# Patient Record
Sex: FEMALE
[De-identification: names, ages, dates, MRNs, and addresses within clinical notes are randomized per-mention and may not be internally consistent; named-entity substitution may affect disease eponyms.]

---

## 2017-09-22 ENCOUNTER — APPOINTMENT (OUTPATIENT)
Dept: UROGYNECOLOGY | Facility: CLINIC | Age: 82
End: 2017-09-22
Payer: MEDICARE

## 2017-09-22 DIAGNOSIS — R39.15 URGENCY OF URINATION: ICD-10-CM

## 2017-09-22 DIAGNOSIS — N99.3 PROLAPSE OF VAGINAL VAULT AFTER HYSTERECTOMY: ICD-10-CM

## 2017-09-22 DIAGNOSIS — Z78.9 OTHER SPECIFIED HEALTH STATUS: ICD-10-CM

## 2017-09-22 DIAGNOSIS — N81.11 CYSTOCELE, MIDLINE: ICD-10-CM

## 2017-09-22 DIAGNOSIS — Z87.828 PERSONAL HISTORY OF OTHER (HEALED) PHYSICAL INJURY AND TRAUMA: ICD-10-CM

## 2017-09-22 DIAGNOSIS — Z87.898 PERSONAL HISTORY OF OTHER SPECIFIED CONDITIONS: ICD-10-CM

## 2017-09-22 DIAGNOSIS — Z86.19 PERSONAL HISTORY OF OTHER INFECTIOUS AND PARASITIC DISEASES: ICD-10-CM

## 2017-09-22 DIAGNOSIS — R35.1 NOCTURIA: ICD-10-CM

## 2017-09-22 DIAGNOSIS — Z86.39 PERSONAL HISTORY OF OTHER ENDOCRINE, NUTRITIONAL AND METABOLIC DISEASE: ICD-10-CM

## 2017-09-22 DIAGNOSIS — R35.0 FREQUENCY OF MICTURITION: ICD-10-CM

## 2017-09-22 DIAGNOSIS — Z82.3 FAMILY HISTORY OF STROKE: ICD-10-CM

## 2017-09-22 DIAGNOSIS — M53.9 DORSOPATHY, UNSPECIFIED: ICD-10-CM

## 2017-09-22 DIAGNOSIS — E78.00 PURE HYPERCHOLESTEROLEMIA, UNSPECIFIED: ICD-10-CM

## 2017-09-22 DIAGNOSIS — N36.2 URETHRAL CARUNCLE: ICD-10-CM

## 2017-09-22 PROCEDURE — 99204 OFFICE O/P NEW MOD 45 MIN: CPT | Mod: 25

## 2017-09-22 PROCEDURE — 51701 INSERT BLADDER CATHETER: CPT

## 2017-09-22 RX ORDER — BENZOCAINE/BENZALKONIUM/ALOE/E 5 %-0.13 %
10 CREAM (GRAM) TOPICAL
Refills: 0 | Status: ACTIVE | COMMUNITY

## 2017-09-22 RX ORDER — ASCORBIC ACID 500 MG
TABLET ORAL
Refills: 0 | Status: ACTIVE | COMMUNITY

## 2017-09-22 RX ORDER — NITROFURANTOIN (MONOHYDRATE/MACROCRYSTALS) 25; 75 MG/1; MG/1
100 CAPSULE ORAL
Qty: 14 | Refills: 0 | Status: ACTIVE | COMMUNITY
Start: 2017-08-29

## 2017-09-22 RX ORDER — PROPRANOLOL HYDROCHLORIDE 60 MG/1
60 CAPSULE, EXTENDED RELEASE ORAL
Qty: 90 | Refills: 0 | Status: ACTIVE | COMMUNITY
Start: 2017-07-19

## 2017-09-22 RX ORDER — FLUCONAZOLE 150 MG/1
150 TABLET ORAL
Qty: 1 | Refills: 0 | Status: ACTIVE | COMMUNITY
Start: 2017-08-29

## 2017-09-22 RX ORDER — VITAMIN E ACID SUCCINATE 268 MG
TABLET ORAL
Refills: 0 | Status: ACTIVE | COMMUNITY

## 2017-09-22 RX ORDER — PROPRANOLOL HCL 60 MG
60 TABLET ORAL
Refills: 0 | Status: ACTIVE | COMMUNITY

## 2017-09-22 RX ORDER — MELATONIN 3 MG
TABLET ORAL
Refills: 0 | Status: ACTIVE | COMMUNITY

## 2017-09-22 RX ORDER — CALCIUM 250 MG
TABLET ORAL
Refills: 0 | Status: ACTIVE | COMMUNITY

## 2017-09-22 RX ORDER — MULTIVITAMIN
TABLET ORAL
Refills: 0 | Status: ACTIVE | COMMUNITY

## 2019-03-13 ENCOUNTER — EMERGENCY (EMERGENCY)
Facility: HOSPITAL | Age: 84
LOS: 1 days | Discharge: HOME | End: 2019-03-13
Attending: EMERGENCY MEDICINE
Payer: MEDICARE

## 2019-03-13 VITALS
HEART RATE: 82 BPM | OXYGEN SATURATION: 97 % | DIASTOLIC BLOOD PRESSURE: 89 MMHG | WEIGHT: 134.92 LBS | SYSTOLIC BLOOD PRESSURE: 170 MMHG | RESPIRATION RATE: 18 BRPM | TEMPERATURE: 98 F | HEIGHT: 64 IN

## 2019-03-13 LAB
ALBUMIN SERPL ELPH-MCNC: 4.4 G/DL — SIGNIFICANT CHANGE UP (ref 3.3–5.2)
ALP SERPL-CCNC: 60 U/L — SIGNIFICANT CHANGE UP (ref 40–120)
ALT FLD-CCNC: 7 U/L — SIGNIFICANT CHANGE UP
ANION GAP SERPL CALC-SCNC: 12 MMOL/L — SIGNIFICANT CHANGE UP (ref 5–17)
AST SERPL-CCNC: 22 U/L — SIGNIFICANT CHANGE UP
BILIRUB SERPL-MCNC: 0.3 MG/DL — LOW (ref 0.4–2)
BUN SERPL-MCNC: 10 MG/DL — SIGNIFICANT CHANGE UP (ref 8–20)
CALCIUM SERPL-MCNC: 9.8 MG/DL — SIGNIFICANT CHANGE UP (ref 8.6–10.2)
CHLORIDE SERPL-SCNC: 95 MMOL/L — LOW (ref 98–107)
CO2 SERPL-SCNC: 27 MMOL/L — SIGNIFICANT CHANGE UP (ref 22–29)
COHGB MFR BLDV: 2.7 % — HIGH (ref 0–2)
CREAT SERPL-MCNC: 0.36 MG/DL — LOW (ref 0.5–1.3)
GLUCOSE SERPL-MCNC: 101 MG/DL — SIGNIFICANT CHANGE UP (ref 70–115)
HCT VFR BLD CALC: 34.9 % — LOW (ref 37–47)
HGB BLD CALC-MCNC: 11.3 G/DL — LOW (ref 11.5–15.5)
HGB BLD-MCNC: 11.2 G/DL — LOW (ref 12–16)
MCHC RBC-ENTMCNC: 21.7 PG — LOW (ref 27–31)
MCHC RBC-ENTMCNC: 32.1 G/DL — SIGNIFICANT CHANGE UP (ref 32–36)
MCV RBC AUTO: 67.8 FL — LOW (ref 81–99)
PLATELET # BLD AUTO: 250 K/UL — SIGNIFICANT CHANGE UP (ref 150–400)
POTASSIUM SERPL-MCNC: 4.2 MMOL/L — SIGNIFICANT CHANGE UP (ref 3.5–5.3)
POTASSIUM SERPL-SCNC: 4.2 MMOL/L — SIGNIFICANT CHANGE UP (ref 3.5–5.3)
PROT SERPL-MCNC: 7.7 G/DL — SIGNIFICANT CHANGE UP (ref 6.6–8.7)
RBC # BLD: 5.15 M/UL — SIGNIFICANT CHANGE UP (ref 4.4–5.2)
RBC # FLD: 14.4 % — SIGNIFICANT CHANGE UP (ref 11–15.6)
SODIUM SERPL-SCNC: 134 MMOL/L — LOW (ref 135–145)
WBC # BLD: 5.7 K/UL — SIGNIFICANT CHANGE UP (ref 4.8–10.8)
WBC # FLD AUTO: 5.7 K/UL — SIGNIFICANT CHANGE UP (ref 4.8–10.8)

## 2019-03-13 PROCEDURE — 36415 COLL VENOUS BLD VENIPUNCTURE: CPT

## 2019-03-13 PROCEDURE — 80053 COMPREHEN METABOLIC PANEL: CPT

## 2019-03-13 PROCEDURE — 71046 X-RAY EXAM CHEST 2 VIEWS: CPT

## 2019-03-13 PROCEDURE — 99283 EMERGENCY DEPT VISIT LOW MDM: CPT | Mod: 25

## 2019-03-13 PROCEDURE — 93010 ELECTROCARDIOGRAM REPORT: CPT

## 2019-03-13 PROCEDURE — 84484 ASSAY OF TROPONIN QUANT: CPT

## 2019-03-13 PROCEDURE — 99284 EMERGENCY DEPT VISIT MOD MDM: CPT

## 2019-03-13 PROCEDURE — 85027 COMPLETE CBC AUTOMATED: CPT

## 2019-03-13 PROCEDURE — 71046 X-RAY EXAM CHEST 2 VIEWS: CPT | Mod: 26

## 2019-03-13 PROCEDURE — 93005 ELECTROCARDIOGRAM TRACING: CPT

## 2019-03-13 PROCEDURE — 82375 ASSAY CARBOXYHB QUANT: CPT
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
## 2019-03-13 NOTE — ED PROVIDER NOTE - PHYSICAL EXAMINATION
Gen: NAD, AOx3  Head: NCAT  HEENT: PERRL, EOMI, oral mucosa moist, normal conjunctiva, neck supple  Lung: CTAB, no respiratory distress  CV: rrr, no murmur, Normal perfusion  Abd: soft, NTND, no CVA tenderness  MSK: No edema, no visible deformities  Neuro: No focal neurologic deficits  Skin: No rash   Psych: normal affect Gen: NAD, AOx3  Head: NCAT  HEENT: PERRL, EOMI, oral mucosa moist, normal conjunctiva, neck supple  Lung: CTAB, no respiratory distress  CV: rrr, no murmur, Normal perfusion  Abd: soft, NTND, no CVA tenderness  MSK: No edema, no visible deformities  Neuro: CN II-XII intact, 5/5 global strength, sensation intact  Skin: No rash   Psych: normal affect Gen: NAD, AOx3  Head: NCAT  HEENT: PERRL, EOMI, oral mucosa moist, normal conjunctiva, neck supple  Lung: CTAB, no respiratory distress  CV: rrr, no murmur, Normal perfusion  Abd: soft, NTND  MSK: No edema, no visible deformities  Neuro: CN II-XII intact, 5/5 global strength, sensation intact  Skin: No rash   Psych: normal affect

## 2019-03-13 NOTE — ED PROVIDER NOTE - CARE PLAN
Principal Discharge DX:	Chest discomfort  Secondary Diagnosis:	Anxiety  Secondary Diagnosis:	Migraines Principal Discharge DX:	Chest discomfort  Assessment and plan of treatment:	1. Return to ED for worsening, progressive or any other concerning symptoms   2. Follow up with your primary care doctor in 2-3days  Secondary Diagnosis:	Anxiety  Secondary Diagnosis:	Migraines

## 2019-03-13 NOTE — ED PROVIDER NOTE - NS_ ATTENDINGSCRIBEDETAILS _ED_A_ED_FT
I, Buffy Guo, performed the initial face to face bedside interview with this patient regarding history of present illness, review of symptoms and relevant past medical, social and family history.  I completed an independent physical examination.  The history, relevant review of systems, past medical and surgical history, medical decision making, and physical examination was documented by the scribe in my presence and I attest to the accuracy of the documentation.
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
## 2019-03-13 NOTE — ED ADULT TRIAGE NOTE - CHIEF COMPLAINT QUOTE
pt BIBA from home for headache, as per EMS they were initially called for a possible CO exposure but there was no reading in the home. pt is awake and alert, states she has had a headache all day and is due for her anxiety meds tonight.

## 2019-03-14 VITALS
HEART RATE: 76 BPM | OXYGEN SATURATION: 96 % | RESPIRATION RATE: 18 BRPM | SYSTOLIC BLOOD PRESSURE: 138 MMHG | DIASTOLIC BLOOD PRESSURE: 71 MMHG

## 2019-03-14 NOTE — ED ADULT NURSE NOTE - NSIMPLEMENTINTERV_GEN_ALL_ED
Implemented All Fall with Harm Risk Interventions:  [place] to call system. Call bell, personal items and telephone within reach. Instruct patient to call for assistance. Room bathroom lighting operational. Non-slip footwear when patient is off stretcher. Physically safe environment: no spills, clutter or unnecessary equipment. Stretcher in lowest position, wheels locked, appropriate side rails in place. Provide visual cue, wrist band, yellow gown, etc. Monitor gait and stability. Monitor for mental status changes and reorient to person, place, and time. Review medications for side effects contributing to fall risk. Reinforce activity limits and safety measures with patient and family. Provide visual clues: red socks.
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
## 2019-05-31 ENCOUNTER — INPATIENT (INPATIENT)
Facility: HOSPITAL | Age: 84
LOS: 4 days | Discharge: NURSING, REHAB OR LONG-TERM CARE | DRG: 552 | End: 2019-06-05
Attending: SURGERY | Admitting: SURGERY
Payer: MEDICARE

## 2019-05-31 VITALS
WEIGHT: 104.06 LBS | TEMPERATURE: 99 F | RESPIRATION RATE: 18 BRPM | HEIGHT: 55 IN | SYSTOLIC BLOOD PRESSURE: 146 MMHG | OXYGEN SATURATION: 95 % | DIASTOLIC BLOOD PRESSURE: 70 MMHG | HEART RATE: 87 BPM

## 2019-05-31 DIAGNOSIS — Z90.710 ACQUIRED ABSENCE OF BOTH CERVIX AND UTERUS: Chronic | ICD-10-CM

## 2019-05-31 DIAGNOSIS — H26.9 UNSPECIFIED CATARACT: Chronic | ICD-10-CM

## 2019-05-31 DIAGNOSIS — S72.001S FRACTURE OF UNSPECIFIED PART OF NECK OF RIGHT FEMUR, SEQUELA: Chronic | ICD-10-CM

## 2019-05-31 DIAGNOSIS — Z90.49 ACQUIRED ABSENCE OF OTHER SPECIFIED PARTS OF DIGESTIVE TRACT: Chronic | ICD-10-CM

## 2019-05-31 PROCEDURE — 70450 CT HEAD/BRAIN W/O DYE: CPT | Mod: 26

## 2019-05-31 PROCEDURE — 72128 CT CHEST SPINE W/O DYE: CPT | Mod: 26

## 2019-05-31 PROCEDURE — 99218: CPT

## 2019-05-31 PROCEDURE — 72125 CT NECK SPINE W/O DYE: CPT | Mod: 26

## 2019-05-31 PROCEDURE — 72170 X-RAY EXAM OF PELVIS: CPT | Mod: 26

## 2019-05-31 PROCEDURE — 71046 X-RAY EXAM CHEST 2 VIEWS: CPT | Mod: 26

## 2019-05-31 RX ORDER — LIDOCAINE 4 G/100G
2 CREAM TOPICAL EVERY 24 HOURS
Refills: 0 | Status: DISCONTINUED | OUTPATIENT
Start: 2019-05-31 | End: 2019-06-05

## 2019-05-31 RX ORDER — ACETAMINOPHEN 500 MG
975 TABLET ORAL ONCE
Refills: 0 | Status: COMPLETED | OUTPATIENT
Start: 2019-05-31 | End: 2019-05-31

## 2019-05-31 NOTE — ED PROVIDER NOTE - PHYSICAL EXAMINATION
pt A&OX4, PERRL, follows commands, speech clear, moves all extremities; head atraumatic, tenderness noted to posterior scalp on palpation, skin intact; cervical spine tenderness noted, no step-offs, left thoracic paraspinal tenderness noted, no step-offs, left rib cage tenderness under left breast, lungs CTA, pt A&OX4, PERRL, follows commands, speech clear, moves all extremities; head atraumatic, tenderness noted to posterior scalp on palpation, skin intact; cervical spine tenderness noted, no step-offs, left thoracic paraspinal tenderness noted, no step-offs, left rib cage tenderness under left breast, lungs CTA, no focal neurological deficits, sensation intact

## 2019-05-31 NOTE — ED CDU PROVIDER INITIAL DAY NOTE - MEDICAL DECISION MAKING DETAILS
hx spinal stenosis s/p fall with acute t7 fx, evaluated by neurosurgery, pt for neuro checks q4, MRI and brace

## 2019-05-31 NOTE — ED PROVIDER NOTE - ATTENDING CONTRIBUTION TO CARE
s/p fall from wheelchair with head and neck pain;   denies loc;   pe awake in nad; heent ncat neck mild tenderness  midline cervical ; chest nontender ; abd soft ;  stable pelvis   dx neck pain and head injury ; ct of head and neck ;  ct neck positive for fx c7   neuro surgery and trauma evaluation s/p fall from rolling walker with head and neck pain;   denies loc;   pe awake in nad; heent ncat neck mild tenderness  midline cervical ; chest nontender ; abd soft ;  stable pelvis   dx neck pain and head injury ; ct of head and neck ;  ct neck positive for fx c7   neuro surgery and trauma evaluation
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
## 2019-05-31 NOTE — ED PROVIDER NOTE - CARE PLAN
Principal Discharge DX:	Closed fracture of seventh thoracic vertebra, unspecified fracture morphology, initial encounter
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
## 2019-05-31 NOTE — ED ADULT NURSE NOTE - NEURO WDL
Alert and oriented to person, place and time, memory intact, behavior appropriate to situation. LALIT

## 2019-05-31 NOTE — ED ADULT NURSE NOTE - NSIMPLEMENTINTERV_GEN_ALL_ED
Implemented All Fall with Harm Risk Interventions:  [place] to call system. Call bell, personal items and telephone within reach. Instruct patient to call for assistance. Room bathroom lighting operational. Non-slip footwear when patient is off stretcher. Physically safe environment: no spills, clutter or unnecessary equipment. Stretcher in lowest position, wheels locked, appropriate side rails in place. Provide visual cue, wrist band, yellow gown, etc. Monitor gait and stability. Monitor for mental status changes and reorient to person, place, and time. Review medications for side effects contributing to fall risk. Reinforce activity limits and safety measures with patient and family. Provide visual clues: red socks.

## 2019-05-31 NOTE — ED CDU PROVIDER INITIAL DAY NOTE - ATTENDING CONTRIBUTION TO CARE
I, William Brian, performed a face to face bedside interview with this patient regarding history of present illness, review of symptoms and relevant past medical, social and family history.  I completed an independent physical examination. I have communicated the patient’s plan of care and disposition with the ACP.       s/p[ fall with c7 fx; pt evaluated by neurosurgery and recommend neuro checks q4 ; pt to be evaluated by trauma I, William Brian, performed a face to face bedside interview with this patient regarding history of present illness, review of symptoms and relevant past medical, social and family history.  I completed an independent physical examination. I have communicated the patient’s plan of care and disposition with the ACP.       s/p[ fall with T7 fx; pt evaluated by neurosurgery and recommend neuro checks q4 ; pt to be evaluated by trauma
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
## 2019-05-31 NOTE — ED PROVIDER NOTE - CLINICAL SUMMARY MEDICAL DECISION MAKING FREE TEXT BOX
87 y/o F with head injury, back and neck pain, and left rib pain s/p fall, no LOC, not on blood thinners, Pain meds, CT head, cervical and thoracic spine, admit vs d/c home 89 y/o F with head injury, back and neck pain, and left rib pain s/p fall, no LOC, not on blood thinners, Pain meds, CT head, cervical and thoracic spine, admit vs d/c home    t7 fracture, seen by neurosurgery, transfer to observation unit for MRI and measurement for brace
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
## 2019-05-31 NOTE — ED PROVIDER NOTE - PSH
H/O: hysterectomy  May 1970 Cataract, right eye  surgery 8/15/18  H/O: hysterectomy  May 1970  Hip fracture requiring operative repair, right, sequela    History of cholecystectomy

## 2019-05-31 NOTE — CONSULT NOTE ADULT - ASSESSMENT
88F s/p fall with T7 fracture     Plan  1. STAT MRI T/S without contrast to r/o ligamentous injury and/or posterior element disruption/ rule out epidural hematoma  2. TLSO brace  3. Bedrest until MRI completed and reviewed by Neurosurgery  4. No antiplatelet/anticoagulation therapy at this time  5. Neuro checks q4  6. Case/films/plan discussed with Dr. Akins

## 2019-05-31 NOTE — ED ADULT NURSE REASSESSMENT NOTE - NS ED NURSE REASSESS COMMENT FT1
Pt to MRI, Pt admitted to OBS. MD Brian aware patient does not have an IV, MD aware As per MD patient does not need IV. Report given to SUZNA Goss in Obs. Safety maintained.

## 2019-05-31 NOTE — ED CDU PROVIDER INITIAL DAY NOTE - NS ED ROS FT
Pt arrives to the ED with c/o head injury without LOC. Pt sustains a laceration to right side of head rated 5/10, bleeding controlled. Pt reports dizziness post injury but states dizziness is improving. Pt denies nausea, vomiting, blurred vision, headache, and neck tenderness. A&Ox4. GCS 15. Pt appears in NAD.    + neck pain, +thoracic spine pain, +fell hit head no LOC, +left rib cage pain an d[pain on deep inspiration

## 2019-05-31 NOTE — ED CDU PROVIDER INITIAL DAY NOTE - PHYSICAL EXAMINATION
pt A&OX4, PERRL, follows commands, speech clear, moves all extremities; head atraumatic, tenderness noted to posterior scalp on palpation, skin intact; cervical spine tenderness noted, no step-offs, left thoracic paraspinal tenderness noted, no step-offs, left rib cage tenderness under left breast, lungs CTA,

## 2019-05-31 NOTE — ED ADULT NURSE REASSESSMENT NOTE - NS ED NURSE REASSESS COMMENT FT1
Assumed care of patient at 1930 from ongoing RN, alert and oriented x4, c/o left sided back pain. Pt took own Tylenol, refused any other pain medications at this time. Family at bedsides, no s/s of distress. PT taken to CT will reassess when patient returns.
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
## 2019-05-31 NOTE — ED PROVIDER NOTE - NS ED ROS FT
+ neck pain, +thoracic spine pain, +fell hit head no LOC, +left rib cage pain an d[pain on deep inspiration

## 2019-05-31 NOTE — ED CDU PROVIDER INITIAL DAY NOTE - PSH
Cataract, right eye  surgery 8/15/18  H/O: hysterectomy  May 1970  Hip fracture requiring operative repair, right, sequela    History of cholecystectomy

## 2019-06-01 DIAGNOSIS — S22.069A UNSPECIFIED FRACTURE OF T7-T8 VERTEBRA, INITIAL ENCOUNTER FOR CLOSED FRACTURE: ICD-10-CM
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
## 2019-06-01 LAB
ANION GAP SERPL CALC-SCNC: 12 MMOL/L — SIGNIFICANT CHANGE UP (ref 5–17)
BASOPHILS # BLD AUTO: 0 K/UL — SIGNIFICANT CHANGE UP (ref 0–0.2)
BASOPHILS NFR BLD AUTO: 0.4 % — SIGNIFICANT CHANGE UP (ref 0–2)
BUN SERPL-MCNC: 14 MG/DL — SIGNIFICANT CHANGE UP (ref 8–20)
CALCIUM SERPL-MCNC: 9.6 MG/DL — SIGNIFICANT CHANGE UP (ref 8.6–10.2)
CHLORIDE SERPL-SCNC: 99 MMOL/L — SIGNIFICANT CHANGE UP (ref 98–107)
CO2 SERPL-SCNC: 28 MMOL/L — SIGNIFICANT CHANGE UP (ref 22–29)
CREAT SERPL-MCNC: 0.57 MG/DL — SIGNIFICANT CHANGE UP (ref 0.5–1.3)
EOSINOPHIL # BLD AUTO: 0.1 K/UL — SIGNIFICANT CHANGE UP (ref 0–0.5)
EOSINOPHIL NFR BLD AUTO: 1.2 % — SIGNIFICANT CHANGE UP (ref 0–6)
GLUCOSE SERPL-MCNC: 90 MG/DL — SIGNIFICANT CHANGE UP (ref 70–115)
HCT VFR BLD CALC: 33.8 % — LOW (ref 37–47)
HGB BLD-MCNC: 10.9 G/DL — LOW (ref 12–16)
LYMPHOCYTES # BLD AUTO: 1.8 K/UL — SIGNIFICANT CHANGE UP (ref 1–4.8)
LYMPHOCYTES # BLD AUTO: 24.8 % — SIGNIFICANT CHANGE UP (ref 20–55)
MAGNESIUM SERPL-MCNC: 2 MG/DL — SIGNIFICANT CHANGE UP (ref 1.6–2.6)
MCHC RBC-ENTMCNC: 21.4 PG — LOW (ref 27–31)
MCHC RBC-ENTMCNC: 32.2 G/DL — SIGNIFICANT CHANGE UP (ref 32–36)
MCV RBC AUTO: 66.3 FL — LOW (ref 81–99)
MONOCYTES # BLD AUTO: 1 K/UL — HIGH (ref 0–0.8)
MONOCYTES NFR BLD AUTO: 13.9 % — HIGH (ref 3–10)
NEUTROPHILS # BLD AUTO: 4.3 K/UL — SIGNIFICANT CHANGE UP (ref 1.8–8)
NEUTROPHILS NFR BLD AUTO: 59.4 % — SIGNIFICANT CHANGE UP (ref 37–73)
PHOSPHATE SERPL-MCNC: 3.6 MG/DL — SIGNIFICANT CHANGE UP (ref 2.4–4.7)
PLATELET # BLD AUTO: 252 K/UL — SIGNIFICANT CHANGE UP (ref 150–400)
POTASSIUM SERPL-MCNC: 4.1 MMOL/L — SIGNIFICANT CHANGE UP (ref 3.5–5.3)
POTASSIUM SERPL-SCNC: 4.1 MMOL/L — SIGNIFICANT CHANGE UP (ref 3.5–5.3)
RBC # BLD: 5.1 M/UL — SIGNIFICANT CHANGE UP (ref 4.4–5.2)
RBC # FLD: 15 % — SIGNIFICANT CHANGE UP (ref 11–15.6)
SODIUM SERPL-SCNC: 139 MMOL/L — SIGNIFICANT CHANGE UP (ref 135–145)
WBC # BLD: 7.3 K/UL — SIGNIFICANT CHANGE UP (ref 4.8–10.8)
WBC # FLD AUTO: 7.3 K/UL — SIGNIFICANT CHANGE UP (ref 4.8–10.8)

## 2019-06-01 PROCEDURE — 99232 SBSQ HOSP IP/OBS MODERATE 35: CPT

## 2019-06-01 PROCEDURE — 99221 1ST HOSP IP/OBS SF/LOW 40: CPT

## 2019-06-01 PROCEDURE — 99222 1ST HOSP IP/OBS MODERATE 55: CPT | Mod: AI

## 2019-06-01 PROCEDURE — 72146 MRI CHEST SPINE W/O DYE: CPT | Mod: 26

## 2019-06-01 PROCEDURE — 99217: CPT

## 2019-06-01 RX ORDER — SERTRALINE 25 MG/1
25 TABLET, FILM COATED ORAL DAILY
Refills: 0 | Status: DISCONTINUED | OUTPATIENT
Start: 2019-06-01 | End: 2019-06-05

## 2019-06-01 RX ORDER — GABAPENTIN 400 MG/1
100 CAPSULE ORAL
Qty: 0 | Refills: 0 | DISCHARGE

## 2019-06-01 RX ORDER — GABAPENTIN 400 MG/1
200 CAPSULE ORAL AT BEDTIME
Refills: 0 | Status: DISCONTINUED | OUTPATIENT
Start: 2019-06-01 | End: 2019-06-02

## 2019-06-01 RX ORDER — PROPRANOLOL HCL 160 MG
60 CAPSULE, EXTENDED RELEASE 24HR ORAL AT BEDTIME
Refills: 0 | Status: DISCONTINUED | OUTPATIENT
Start: 2019-06-01 | End: 2019-06-05

## 2019-06-01 RX ORDER — GABAPENTIN 400 MG/1
200 CAPSULE ORAL
Qty: 0 | Refills: 0 | DISCHARGE

## 2019-06-01 RX ORDER — SERTRALINE 25 MG/1
1 TABLET, FILM COATED ORAL
Qty: 0 | Refills: 0 | DISCHARGE

## 2019-06-01 RX ORDER — ACETAMINOPHEN 500 MG
650 TABLET ORAL EVERY 6 HOURS
Refills: 0 | Status: DISCONTINUED | OUTPATIENT
Start: 2019-06-01 | End: 2019-06-05

## 2019-06-01 RX ORDER — PROPRANOLOL HCL 160 MG
1 CAPSULE, EXTENDED RELEASE 24HR ORAL
Qty: 0 | Refills: 0 | DISCHARGE

## 2019-06-01 RX ORDER — TRAMADOL HYDROCHLORIDE 50 MG/1
25 TABLET ORAL EVERY 4 HOURS
Refills: 0 | Status: DISCONTINUED | OUTPATIENT
Start: 2019-06-01 | End: 2019-06-05

## 2019-06-01 RX ORDER — ACETAMINOPHEN 500 MG
650 TABLET ORAL EVERY 6 HOURS
Refills: 0 | Status: DISCONTINUED | OUTPATIENT
Start: 2019-06-01 | End: 2019-06-01

## 2019-06-01 RX ADMIN — Medication 650 MILLIGRAM(S): at 23:00

## 2019-06-01 RX ADMIN — Medication 650 MILLIGRAM(S): at 04:30

## 2019-06-01 RX ADMIN — Medication 650 MILLIGRAM(S): at 15:35

## 2019-06-01 RX ADMIN — Medication 650 MILLIGRAM(S): at 16:35

## 2019-06-01 RX ADMIN — Medication 650 MILLIGRAM(S): at 03:34

## 2019-06-01 RX ADMIN — Medication 650 MILLIGRAM(S): at 22:10

## 2019-06-01 RX ADMIN — GABAPENTIN 200 MILLIGRAM(S): 400 CAPSULE ORAL at 22:09

## 2019-06-01 RX ADMIN — Medication 60 MILLIGRAM(S): at 22:09

## 2019-06-01 NOTE — CONSULT NOTE ADULT - ASSESSMENT
88 year old female history of chronic back pain now s/p fall from seated walker found to have an acute T7 spinal fracture.  - admit to trauma surgery   - pain control  - restart home medications  - Neurosurgery on board: recommend MRI spine, TLSO brace, q4 neurochecks, no AC, bedrest until MRI completed/reviewed by NSx  - f/u MRI official read  - PT when pt has brace

## 2019-06-01 NOTE — ED CDU PROVIDER DISPOSITION NOTE - CLINICAL COURSE
female pmh spinal stenosis,  s/p fall with T7 spinal fracture;  pt to be admitted to service of Trauma;

## 2019-06-01 NOTE — PROGRESS NOTE ADULT - ASSESSMENT
88y Female PMH chronic back pain followed by neurologist s/p fall from seated walker found with acute T7 fracture, acute to subacute L1 inferior endplate fx.  - No focal neurologic deficit    PLAN:  - D/w Dr. Akins  - TLSO when OOB, orthotist to bring to bedside today  - Pain control PRN  - PT/OT/PM&R evals once brace at bedside  - Supportive care/further medical management per primary team  - Follow up with Dr. Akins or if preferred due to location of offices, Dr. Snider in 2 weeks  - Reconsult PRN 88y Female PMH chronic back pain followed by neurologist s/p fall from seated walker found with acute T7 fracture, acute to subacute L1 inferior endplate fx.  - No focal neurologic deficit  - MR T spine w/ acute T7 fx without ligamentous injury or posterior element involvement (d/w Dr. Alexandra)    PLAN:  - D/w Dr. Akins  - TLSO when OOB, orthotist to bring to bedside today  - Pain control PRN  - PT/OT/PM&R evals once brace at bedside  - Supportive care/further medical management per primary team  - Follow up with Dr. Akins or if preferred due to location of offices, Dr. Snider in 2 weeks  - Reconsult PRN

## 2019-06-01 NOTE — ED ADULT NURSE REASSESSMENT NOTE - NS ED NURSE REASSESS COMMENT FT1
assumed pt care from previous Rn.  pt transported to CDU-9 for observation. pt  A&Ox3; with slight discomfort to lower back. pt refusing pain medication at this time. pt reported, " I took Tylenol before at it helped." pt repositioned in bed for comfort.   resting in stretcher,  talking with daughter's at bedside. B/L lungs clear, normal s1&s2 heard on ausculation. (+) pedal pulses, skin warm/dry.  VSS and documented as per flow sheet. plan of care reviewed and pt/family verbalizes understanding. bed in lowest position, call bell within reach and safety maintained. monitoring ongoing for any changes.
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
## 2019-06-01 NOTE — PROGRESS NOTE ADULT - SUBJECTIVE AND OBJECTIVE BOX
patient evaluated measured and fitted for tlso spinal orthosis  ordered by neurosurgery delivered by [facility] [phone]

## 2019-06-01 NOTE — ED CDU PROVIDER DISPOSITION NOTE - ATTENDING CONTRIBUTION TO CARE
evaluated by trauma for fall with T7 stable fracture; evaluated by trauma for fall with T7 stable fracture; pt to be admitted to trauma service
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
## 2019-06-01 NOTE — CONSULT NOTE ADULT - ATTENDING COMMENTS
Agree with above assessment.  The patient was seen and examined by me.  The patient was reported to have fallen from seated walker.  The patient is without LOC.  She has complaints of back pain.  HEENT NC/AT PERRL EOMI no raccoon eyes, no ortega signs, trachea midline, no gross mass, chest bilateral air entry, abdomen is soft and nontender, no guarding, no rebound, no pelvic tenderness, extremities with no gross deformity.  The patient on CT scan has a T7 fracture.  MRI results to rule out ligamentous injury is pending.  Admit to trauma, neurosurgery consult, pain control.  Will need PT evaluation.  Awaiting Neurosurgery evaluation and MRI results.

## 2019-06-02 PROBLEM — G43.909 MIGRAINE, UNSPECIFIED, NOT INTRACTABLE, WITHOUT STATUS MIGRAINOSUS: Chronic | Status: ACTIVE | Noted: 2019-03-13

## 2019-06-02 PROBLEM — M48.00 SPINAL STENOSIS, SITE UNSPECIFIED: Chronic | Status: ACTIVE | Noted: 2019-03-13

## 2019-06-02 PROBLEM — M54.9 DORSALGIA, UNSPECIFIED: Chronic | Status: ACTIVE | Noted: 2019-03-13

## 2019-06-02 LAB
ANION GAP SERPL CALC-SCNC: 11 MMOL/L — SIGNIFICANT CHANGE UP (ref 5–17)
BASOPHILS # BLD AUTO: 0 K/UL — SIGNIFICANT CHANGE UP (ref 0–0.2)
BASOPHILS NFR BLD AUTO: 0.3 % — SIGNIFICANT CHANGE UP (ref 0–2)
BUN SERPL-MCNC: 13 MG/DL — SIGNIFICANT CHANGE UP (ref 8–20)
CALCIUM SERPL-MCNC: 9.4 MG/DL — SIGNIFICANT CHANGE UP (ref 8.6–10.2)
CHLORIDE SERPL-SCNC: 98 MMOL/L — SIGNIFICANT CHANGE UP (ref 98–107)
CO2 SERPL-SCNC: 29 MMOL/L — SIGNIFICANT CHANGE UP (ref 22–29)
CREAT SERPL-MCNC: 0.56 MG/DL — SIGNIFICANT CHANGE UP (ref 0.5–1.3)
EOSINOPHIL # BLD AUTO: 0.2 K/UL — SIGNIFICANT CHANGE UP (ref 0–0.5)
EOSINOPHIL NFR BLD AUTO: 3.1 % — SIGNIFICANT CHANGE UP (ref 0–6)
GLUCOSE SERPL-MCNC: 85 MG/DL — SIGNIFICANT CHANGE UP (ref 70–115)
HCT VFR BLD CALC: 34.2 % — LOW (ref 37–47)
HGB BLD-MCNC: 10.7 G/DL — LOW (ref 12–16)
LYMPHOCYTES # BLD AUTO: 2.2 K/UL — SIGNIFICANT CHANGE UP (ref 1–4.8)
LYMPHOCYTES # BLD AUTO: 29.5 % — SIGNIFICANT CHANGE UP (ref 20–55)
MAGNESIUM SERPL-MCNC: 2 MG/DL — SIGNIFICANT CHANGE UP (ref 1.6–2.6)
MCHC RBC-ENTMCNC: 21.5 PG — LOW (ref 27–31)
MCHC RBC-ENTMCNC: 31.3 G/DL — LOW (ref 32–36)
MCV RBC AUTO: 68.7 FL — LOW (ref 81–99)
MONOCYTES # BLD AUTO: 1 K/UL — HIGH (ref 0–0.8)
MONOCYTES NFR BLD AUTO: 13.7 % — HIGH (ref 3–10)
NEUTROPHILS # BLD AUTO: 3.9 K/UL — SIGNIFICANT CHANGE UP (ref 1.8–8)
NEUTROPHILS NFR BLD AUTO: 53.3 % — SIGNIFICANT CHANGE UP (ref 37–73)
PHOSPHATE SERPL-MCNC: 3.3 MG/DL — SIGNIFICANT CHANGE UP (ref 2.4–4.7)
PLATELET # BLD AUTO: 229 K/UL — SIGNIFICANT CHANGE UP (ref 150–400)
POTASSIUM SERPL-MCNC: 4.9 MMOL/L — SIGNIFICANT CHANGE UP (ref 3.5–5.3)
POTASSIUM SERPL-SCNC: 4.9 MMOL/L — SIGNIFICANT CHANGE UP (ref 3.5–5.3)
RBC # BLD: 4.98 M/UL — SIGNIFICANT CHANGE UP (ref 4.4–5.2)
RBC # FLD: 14.9 % — SIGNIFICANT CHANGE UP (ref 11–15.6)
SODIUM SERPL-SCNC: 138 MMOL/L — SIGNIFICANT CHANGE UP (ref 135–145)
WBC # BLD: 7.4 K/UL — SIGNIFICANT CHANGE UP (ref 4.8–10.8)
WBC # FLD AUTO: 7.4 K/UL — SIGNIFICANT CHANGE UP (ref 4.8–10.8)

## 2019-06-02 PROCEDURE — 99231 SBSQ HOSP IP/OBS SF/LOW 25: CPT

## 2019-06-02 RX ORDER — SENNA PLUS 8.6 MG/1
2 TABLET ORAL AT BEDTIME
Refills: 0 | Status: DISCONTINUED | OUTPATIENT
Start: 2019-06-02 | End: 2019-06-05

## 2019-06-02 RX ORDER — GABAPENTIN 400 MG/1
100 CAPSULE ORAL DAILY
Refills: 0 | Status: DISCONTINUED | OUTPATIENT
Start: 2019-06-02 | End: 2019-06-05

## 2019-06-02 RX ORDER — GABAPENTIN 400 MG/1
200 CAPSULE ORAL AT BEDTIME
Refills: 0 | Status: DISCONTINUED | OUTPATIENT
Start: 2019-06-02 | End: 2019-06-05

## 2019-06-02 RX ORDER — DOCUSATE SODIUM 100 MG
100 CAPSULE ORAL THREE TIMES A DAY
Refills: 0 | Status: DISCONTINUED | OUTPATIENT
Start: 2019-06-02 | End: 2019-06-05

## 2019-06-02 RX ORDER — POLYETHYLENE GLYCOL 3350 17 G/17G
17 POWDER, FOR SOLUTION ORAL DAILY
Refills: 0 | Status: DISCONTINUED | OUTPATIENT
Start: 2019-06-02 | End: 2019-06-05

## 2019-06-02 RX ADMIN — Medication 60 MILLIGRAM(S): at 21:52

## 2019-06-02 RX ADMIN — Medication 650 MILLIGRAM(S): at 21:52

## 2019-06-02 RX ADMIN — GABAPENTIN 200 MILLIGRAM(S): 400 CAPSULE ORAL at 21:53

## 2019-06-02 RX ADMIN — GABAPENTIN 100 MILLIGRAM(S): 400 CAPSULE ORAL at 12:24

## 2019-06-02 RX ADMIN — Medication 650 MILLIGRAM(S): at 22:40

## 2019-06-02 RX ADMIN — Medication 100 MILLIGRAM(S): at 21:53

## 2019-06-02 RX ADMIN — SERTRALINE 25 MILLIGRAM(S): 25 TABLET, FILM COATED ORAL at 12:27

## 2019-06-02 RX ADMIN — Medication 650 MILLIGRAM(S): at 12:14

## 2019-06-02 RX ADMIN — Medication 650 MILLIGRAM(S): at 13:15

## 2019-06-02 RX ADMIN — LIDOCAINE 2 PATCH: 4 CREAM TOPICAL at 12:15

## 2019-06-02 NOTE — PHYSICAL THERAPY INITIAL EVALUATION ADULT - PLANNED THERAPY INTERVENTIONS, PT EVAL
motor coordination training/transfer training/strengthening/gait training/ROM/balance training/bed mobility training/neuromuscular re-education

## 2019-06-02 NOTE — PROGRESS NOTE ADULT - ASSESSMENT
88 year old female history of chronic back pain now s/p fall from seated walker found to have an acute T7 spinal fracture.  - pain control  -TLSO brace  -Regular diet  -f/u PT/OT/PMR

## 2019-06-02 NOTE — PHYSICAL THERAPY INITIAL EVALUATION ADULT - ADDITIONAL COMMENTS
Pt reports ambulating with RW indoors, uses rollator for community. Pt does not drive, daughter assists with grocery shopping, cooking, doctor appts

## 2019-06-02 NOTE — PROGRESS NOTE ADULT - SUBJECTIVE AND OBJECTIVE BOX
HPI/OVERNIGHT EVENTS: Patient seen and examined at bedside this AM. No acute events overnight per nursing reports. Patient's pain well controlled, tolerating a regular diet, having bowel function. Denies fever, chills, nausea, vomitting, chest pain, SOB, dizziness, abd pain or any other concerning symptoms    Vital Signs Last 24 Hrs  T(C): 36.7 (02 Jun 2019 05:38), Max: 37.2 (01 Jun 2019 08:34)  T(F): 98 (02 Jun 2019 05:38), Max: 99 (01 Jun 2019 08:34)  HR: 67 (02 Jun 2019 05:38) (67 - 81)  BP: 126/85 (02 Jun 2019 05:38) (123/65 - 137/66)  BP(mean): --  RR: 18 (02 Jun 2019 05:38) (16 - 18)  SpO2: 98% (02 Jun 2019 05:38) (92% - 98%)    I&O's Detail    31 May 2019 07:01  -  01 Jun 2019 07:00  --------------------------------------------------------  IN:  Total IN: 0 mL    OUT:    Voided: 2 mL  Total OUT: 2 mL    Total NET: -2 mL      01 Jun 2019 07:01  -  02 Jun 2019 05:43  --------------------------------------------------------  IN:    Oral Fluid: 240 mL  Total IN: 240 mL    OUT:    Voided: 1 mL  Total OUT: 1 mL    Total NET: 239 mL        Constitutional: patient resting comfortably in bed, in no acute distress  HEENT: EOMI / PERRL b/l  Neck: No JVD, full ROM without pain  Respiratory: CTAB respirations are unlabored, no accessory muscle use, no conversational dyspnea  Cardiovascular: regular rate & rhythm  Gastrointestinal: Abdomen soft, non-tender, non-distended, no rebound tenderness / guarding  Musculoskeletal: No joint pain, swelling or deformity    LABS:                        10.9   7.3   )-----------( 252      ( 01 Jun 2019 08:07 )             33.8     06-01    139  |  99  |  14.0  ----------------------------<  90  4.1   |  28.0  |  0.57    Ca    9.6      01 Jun 2019 08:07  Phos  3.6     06-01  Mg     2.0     06-01            MEDICATIONS  (STANDING):  acetaminophen   Tablet .. 650 milliGRAM(s) Oral every 6 hours  gabapentin   Solution 200 milliGRAM(s) Oral at bedtime  lidocaine   Patch 2 Patch Transdermal every 24 hours  propranolol LA 60 milliGRAM(s) Oral at bedtime  sertraline 25 milliGRAM(s) Oral daily    MEDICATIONS  (PRN):  traMADol 25 milliGRAM(s) Oral every 4 hours PRN Severe Pain (7 - 10)      MICRO:   Cultures     STUDIES:   EKG, CXR, U/S, CT, MRI

## 2019-06-02 NOTE — PHYSICAL THERAPY INITIAL EVALUATION ADULT - IMPAIRMENTS CONTRIBUTING TO GAIT DEVIATIONS, PT EVAL
pain/impaired balance/impaired coordination/impaired motor control/narrow base of support/decreased ROM/decreased strength

## 2019-06-02 NOTE — PHYSICAL THERAPY INITIAL EVALUATION ADULT - PHYSICAL ASSIST/NONPHYSICAL ASSIST: STAND/SIT, REHAB EVAL
[facility]        NAME: Alejandro Lawler is a 22 y o  female  : 1993    MRN: [id number]  DATE: 2018  TIME: 8:34 AM    Assessment and Plan   39 weeks gestation of pregnancy [Z3A [id number]]  1  39 weeks gestation of pregnancy           Patient Instructions   Patient is currently 44 months pregnant and do the beginning of October  She presents with concerns that her blood pressure was higher than normal approximately 120/80  She denies vision changes, headache, dizziness, syncope, weakness,etc   She also reports that the baby is active  No vaginal bleeding  I advised her to call her Ob/gyn today to report her blood pressure recordings  She should continue to monitor  Follow up with PCP in 3-5 days  Proceed to  ER if symptoms worsen  Chief Complaint     Chief Complaint   Patient presents with    Hypertension     pt thought blood pressure was elevated this am , pt is pregnant due oct 6th  History of Present Illness   Patient is a 26-year-old female who presents with concerns of elevated blood pressure from a reading this morning  She is currently 39 weeks pregnant  She states that she has had a normal pregnancy without complication  Her blood pressure is typically low 100's  This morning she took her blood pressure at home and it was 120's/80  She currently denies blurred vision or vision changes  Negative ringing in her ears  Negative headache  Negative swelling of her face or extremities that has occurred suddenly  She denies vaginal bleeding  Negative chest pain or shortness of breath  Negative dizziness or syncope  She states that the baby is active  HPI    Review of Systems   Review of Systems   Constitutional: Negative for activity change, chills and fever  Respiratory: Negative for chest tightness and shortness of breath  Cardiovascular: Negative for chest pain, palpitations and leg swelling  Genitourinary: Negative for vaginal bleeding     Skin: Negative for pallor  Neurological: Negative for dizziness, tremors, seizures, syncope, facial asymmetry, speech difficulty, weakness, light-headedness, numbness and headaches  All other systems reviewed and are negative  Current Medications       Current Outpatient Prescriptions:     levothyroxine 75 mcg tablet, Take 75 mcg by mouth daily, Disp: , Rfl:     nystatin (MYCOSTATIN) powder, Apply topically 2 (two) times a day, Disp: 45 g, Rfl: 0    Prenatal Vit-Fe Fumarate-FA (PRENATAL VITAMIN) 27-0 8 MG TABS, Take by mouth, Disp: , Rfl:     Current Allergies     Allergies as of 09/25/2018 - Reviewed 09/25/2018   Allergen Reaction Noted    Penicillins Other (See Comments) 03/29/2018            The following portions of the patient's history were reviewed and updated as appropriate: allergies, current medications, past family history, past medical history, past social history, past surgical history and problem list      Past Medical History:   Diagnosis Date    Hashimoto's disease     Varicella        Past Surgical History:   Procedure Laterality Date    MYRINGOTOMY      NOSE SURGERY         Family History   Problem Relation Age of Onset    Hashimoto's thyroiditis Mother     Chronic bronchitis Father          Medications have been verified  Objective   /79   Pulse 86   Temp 98 1 °F (36 7 °C) (Temporal)   Resp 20   Ht 5' 2" (1 575 m)   Wt 78 1 kg (172 lb 2 oz)   LMP 12/30/2017 (Exact Date)   BMI 31 48 kg/m²        Physical Exam     Physical Exam   Constitutional: She is oriented to person, place, and time  She appears well-developed and well-nourished  No distress  HENT:   Head: Normocephalic and atraumatic  Eyes: Conjunctivae and EOM are normal  Pupils are equal, round, and reactive to light  Right eye exhibits no discharge  Left eye exhibits no discharge  No scleral icterus  Cardiovascular: Normal rate, regular rhythm, normal heart sounds and intact distal pulses    Exam reveals no gallop and no friction rub  No murmur heard  Pulmonary/Chest: Effort normal and breath sounds normal  No respiratory distress  She has no wheezes  She has no rales  She exhibits no tenderness  Abdominal: Soft  Bowel sounds are normal  There is no tenderness  Distended abdomen-pregnancy  Baby is moving  Musculoskeletal: Normal range of motion  She exhibits edema (Trace lower extremity bilaterally)  She exhibits no tenderness or deformity  Neurological: She is alert and oriented to person, place, and time  Skin: Skin is warm and dry  No rash noted  She is not diaphoretic  No erythema  No pallor  Psychiatric: She has a normal mood and affect  Her behavior is normal    Nursing note and vitals reviewed  1 person assist
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
## 2019-06-03 ENCOUNTER — TRANSCRIPTION ENCOUNTER (OUTPATIENT)
Age: 84
End: 2019-06-03

## 2019-06-03 PROCEDURE — 99231 SBSQ HOSP IP/OBS SF/LOW 25: CPT

## 2019-06-03 PROCEDURE — 99222 1ST HOSP IP/OBS MODERATE 55: CPT

## 2019-06-03 RX ORDER — ENOXAPARIN SODIUM 100 MG/ML
40 INJECTION SUBCUTANEOUS DAILY
Refills: 0 | Status: DISCONTINUED | OUTPATIENT
Start: 2019-06-03 | End: 2019-06-05

## 2019-06-03 RX ADMIN — Medication 650 MILLIGRAM(S): at 14:00

## 2019-06-03 RX ADMIN — Medication 650 MILLIGRAM(S): at 13:22

## 2019-06-03 RX ADMIN — Medication 650 MILLIGRAM(S): at 06:20

## 2019-06-03 RX ADMIN — Medication 60 MILLIGRAM(S): at 20:19

## 2019-06-03 RX ADMIN — Medication 100 MILLIGRAM(S): at 05:27

## 2019-06-03 RX ADMIN — Medication 650 MILLIGRAM(S): at 05:27

## 2019-06-03 RX ADMIN — GABAPENTIN 200 MILLIGRAM(S): 400 CAPSULE ORAL at 20:19

## 2019-06-03 RX ADMIN — GABAPENTIN 100 MILLIGRAM(S): 400 CAPSULE ORAL at 13:22

## 2019-06-03 RX ADMIN — SERTRALINE 25 MILLIGRAM(S): 25 TABLET, FILM COATED ORAL at 13:21

## 2019-06-03 RX ADMIN — LIDOCAINE 2 PATCH: 4 CREAM TOPICAL at 00:30

## 2019-06-03 RX ADMIN — Medication 650 MILLIGRAM(S): at 20:24

## 2019-06-03 RX ADMIN — Medication 650 MILLIGRAM(S): at 21:20

## 2019-06-03 NOTE — PROGRESS NOTE ADULT - PROBLEM SELECTOR PLAN 1
- Neurosx recs appreciated - TLSO when OOB  - Continue work with PT, OT, and PM&R  - Pain regimen as ordered - options limited due to multiple listed allergies  - Regular diet, as tolerated  - DVT ppx with lovenox & b/l SCDs  - Incentive spirometer for pulm toileting

## 2019-06-03 NOTE — OCCUPATIONAL THERAPY INITIAL EVALUATION ADULT - ADDITIONAL COMMENTS
Pt states lives alone since  passed away 3 weeks ago and pays for a 24 hour aid at home and family assist as well with IADLs. Pt has RW, rollator and shower chair but sponge baths at home and aid assists. Pt lives in a home and no stairs to enter.

## 2019-06-03 NOTE — DISCHARGE NOTE PROVIDER - CARE PROVIDERS DIRECT ADDRESSES
[email or web address]
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
## 2019-06-03 NOTE — OCCUPATIONAL THERAPY INITIAL EVALUATION ADULT - RANGE OF MOTION EXAMINATION
no Active ROM deficits were identified/pt has premorbid rotator cuff injuries and only can flex shoulders to 80 degrees

## 2019-06-03 NOTE — DISCHARGE NOTE PROVIDER - CARE PROVIDER_API CALL
Isaac Snider)  Neurosurgery  [facility] [address]  [place] [address]  Phone: [phone]  Fax: [phone]  Follow Up Time:     William Akins; PhD)  Neurosurgery  [address] [facility], 2nd [place] [address]  Phone: [phone]  Fax: [phone]  Follow Up Time:

## 2019-06-03 NOTE — CONSULT NOTE ADULT - SUBJECTIVE AND OBJECTIVE BOX
88y Female was admitted on 06-01  In ED, GCS= 15    Patient is a 88y old  Female who presents with a chief complaint of fall  HPI: 88 year old female with a history of chronic back pain and baseline urinary incontinence presents to ED on 5/21 s/p fall from seated walker, -LOC, +headstrike. Per patient, daughter was rolling pt in a seated rolling walker when they hit a curb causing the pt to fall backward onto the walker's back bar causing immediate pain. Pain radiated down entire spine, 10/10 pain which lasted several minutes. The pain has subsided slightly but pt reports both spinal and paraspinal pain at this time, now 6/10 in severity. She also reported head soreness and neck soreness. Denies headache, changes in vision, chest pain, shortness of breath, nausea, vomiting. CT spine demonstrated acute nondisplaced T7 fracture. MRI of thoracic spine redisplaying T7 vertebral body fx with acute/subacute L1 fracture as well without posterior element involvement or ligamentous injury. Patient was evaluated by neurosurgery and trauma surgery teams and recommended conservative management with TLSO brace and pain control.     Imaging showed:  HEAD CT - No acute findings  C SPINE CT - No acute findings, Mild/moderate central C3-4 disc extrusion.  CT spine demonstrates acute nondisplaced T7 fracture.  MRI thoracic Spine: Acute nondisplaced fracture through the T7 vertebral body. Acute to subacute fracture inferior endplate L1. Spinal cord negative      TODAY'S SUBJECTIVE HX:    REVIEW OF SYSTEMS  Constitutional - No fever, No weight loss, No fatigue  HEENT - No eye pain, No visual disturbances, No difficulty hearing, No tinnitus, No vertigo, No neck pain  Respiratory - No cough, No wheezing, No shortness of breath  Cardiovascular - No chest pain, No palpitations  Gastrointestinal - No abdominal pain, No nausea, No vomiting, No diarrhea, No constipation  Genitourinary - No dysuria, No frequency, No hematuria, No incontinence  Neurological - No headaches, No memory loss, No loss of strength, No numbness, No tremors  Skin - No itching, No rashes, No lesions   Endocrine - No temperature intolerance  Musculoskeletal - No joint pain, No joint swelling, No muscle pain  Psychiatric - No depression, No anxiety    VITALS  T(C): 36.6 (06-03-19 @ 07:15), Max: 37.3 (06-02-19 @ 20:37)  HR: 77 (06-03-19 @ 07:15) (74 - 87)  BP: 141/71 (06-03-19 @ 07:15) (138/76 - 158/78)  RR: 18 (06-03-19 @ 07:15) (17 - 18)  SpO2: 91% (06-03-19 @ 07:15) (91% - 96%)  Wt(kg): --    PAST MEDICAL & SURGICAL HISTORY  Chronic back pain  Migraines  Spinal stenosis  Cataract, right eye  Hip fracture requiring operative repair, right, sequela  History of cholecystectomy  H/O: hysterectomy  No significant past surgical history      SOCIAL HISTORY  Smoking - Denied  EtOH - Denied   Drugs - Denied    FUNCTIONAL HISTORY  Lives alone. Patient is homemaker. No CINDY or inside house.  PTA: Independent. Ambulates with RW indoors and rollator for community Daughter assists with groceries /cooking and appointments    CURRENT FUNCTIONAL STATUS 6/2  Bed mobility: modd A  Transfers: mod A  Gait: min a with RW 10 feet with TLSO  ADLs: tba    FAMILY HISTORY       RECENT LABS/IMAGING  CBC Full  -  ( 02 Jun 2019 08:23 )  WBC Count : 7.4 K/uL  RBC Count : 4.98 M/uL  Hemoglobin : 10.7 g/dL  Hematocrit : 34.2 %  Platelet Count - Automated : 229 K/uL  Mean Cell Volume : 68.7 fl  Mean Cell Hemoglobin : 21.5 pg  Mean Cell Hemoglobin Concentration : 31.3 g/dL  Auto Neutrophil # : 3.9 K/uL  Auto Lymphocyte # : 2.2 K/uL  Auto Monocyte # : 1.0 K/uL  Auto Eosinophil # : 0.2 K/uL  Auto Basophil # : 0.0 K/uL  Auto Neutrophil % : 53.3 %  Auto Lymphocyte % : 29.5 %  Auto Monocyte % : 13.7 %  Auto Eosinophil % : 3.1 %  Auto Basophil % : 0.3 %    06-02    138  |  98  |  13.0  ----------------------------<  85  4.9   |  29.0  |  0.56    Ca    9.4      02 Jun 2019 08:23  Phos  3.3     06-02  Mg     2.0     06-02          ALLERGIES  Arthrotec (Unknown)  aspirin (Unknown)  Benadryl (Unknown)  Ceclor (Unknown)  codeine (Unknown)  cortisone (Unknown)  doxycycline (Unknown)  erythromycin (Unknown)  Levaquin (Unknown)  penicillins (Unknown)  sulfa drugs (Unknown)  tetracycline (Unknown)  Toprol-XL (Unknown)  Valium (Unknown)  Vibramycin (Unknown)  Zinc (Unknown)      MEDICATIONS   acetaminophen   Tablet .. 650 milliGRAM(s) Oral every 6 hours  docusate sodium 100 milliGRAM(s) Oral three times a day  gabapentin 100 milliGRAM(s) Oral daily  gabapentin 200 milliGRAM(s) Oral at bedtime  lidocaine   Patch 2 Patch Transdermal every 24 hours  polyethylene glycol 3350 17 Gram(s) Oral daily  propranolol LA 60 milliGRAM(s) Oral at bedtime  senna 2 Tablet(s) Oral at bedtime  sertraline 25 milliGRAM(s) Oral daily  traMADol 25 milliGRAM(s) Oral every 4 hours PRN 88y Female was admitted on 06-01  In ED, GCS= 15    Patient is a 88y old  Female who presents with a chief complaint of fall  HPI: 88 year old female with a history of chronic back pain and baseline urinary incontinence presents to ED on 5/21 s/p fall from seated walker, -LOC, +headstrike. Per patient, daughter was rolling pt in a seated rolling walker when they hit a curb causing the pt to fall backward onto the walker's back bar causing immediate pain. Pain radiated down entire spine, 10/10 pain which lasted several minutes. The pain has subsided slightly but pt reports both spinal and paraspinal pain at this time, now 6/10 in severity. She also reported head soreness and neck soreness. Denies headache, changes in vision, chest pain, shortness of breath, nausea, vomiting. CT spine demonstrated acute nondisplaced T7 fracture. MRI of thoracic spine redisplaying T7 vertebral body fx with acute/subacute L1 fracture as well without posterior element involvement or ligamentous injury. Patient was evaluated by neurosurgery and trauma surgery teams and recommended conservative management with TLSO brace and pain control.     Imaging showed:  HEAD CT - No acute findings  C SPINE CT - No acute findings, Mild/moderate central C3-4 disc extrusion.  CT spine demonstrates acute nondisplaced T7 fracture.  MRI thoracic Spine: Acute nondisplaced fracture through the T7 vertebral body. Acute to subacute fracture inferior endplate L1. Spinal cord negative      TODAY'S SUBJECTIVE HX:  Patient still endorses pain in the mid-back that is worsened with activity and deep inhalation. She states that most of her pain is in the AM during activity.     REVIEW OF SYSTEMS  Constitutional - No fever, admits general fatigue  HEENT - admits difficulty hearing especially on the left  Respiratory - No cough, No wheezing, No shortness of breath  Cardiovascular - No chest pain, No palpitations  Gastrointestinal - No abdominal pain, No nausea, No vomiting, No diarrhea, No constipation  Genitourinary - No dysuria, admits chronic incontinenece  Neurological - No headaches,  No loss of strength, No numbness  Musculoskeletal - admits back pain      VITALS  T(C): 36.6 (06-03-19 @ 07:15), Max: 37.3 (06-02-19 @ 20:37)  HR: 77 (06-03-19 @ 07:15) (74 - 87)  BP: 141/71 (06-03-19 @ 07:15) (138/76 - 158/78)  RR: 18 (06-03-19 @ 07:15) (17 - 18)  SpO2: 91% (06-03-19 @ 07:15) (91% - 96%)  Wt(kg): --    PAST MEDICAL & SURGICAL HISTORY  Chronic back pain  Migraines  Spinal stenosis  Cataract, right eye  Hip fracture requiring operative repair, right, sequela  History of cholecystectomy  H/O: hysterectomy  No significant past surgical history      SOCIAL HISTORY  Smoking - Denied  EtOH - Denied   Drugs - Denied    FUNCTIONAL HISTORY  Lives alone. Patient is homemaker. No CINDY or inside house. PTA patient Ambulates with RW indoors and rollator for community. Daughter assists with groceries /cooking and appointments. patient states that she required assistance with transfers from bed and PTA had her husbands aid help with this. However, her  recently passed away and she lost her social security benefits so does not have her own aid.    CURRENT FUNCTIONAL STATUS 6/2  Bed mobility: mod A  Transfers: mod A  Gait: min a with RW 10 feet with TLSO  ADLs: tba    FAMILY HISTORY       RECENT LABS/IMAGING  CBC Full  -  ( 02 Jun 2019 08:23 )  WBC Count : 7.4 K/uL  RBC Count : 4.98 M/uL  Hemoglobin : 10.7 g/dL  Hematocrit : 34.2 %  Platelet Count - Automated : 229 K/uL  Mean Cell Volume : 68.7 fl  Mean Cell Hemoglobin : 21.5 pg  Mean Cell Hemoglobin Concentration : 31.3 g/dL  Auto Neutrophil # : 3.9 K/uL  Auto Lymphocyte # : 2.2 K/uL  Auto Monocyte # : 1.0 K/uL  Auto Eosinophil # : 0.2 K/uL  Auto Basophil # : 0.0 K/uL  Auto Neutrophil % : 53.3 %  Auto Lymphocyte % : 29.5 %  Auto Monocyte % : 13.7 %  Auto Eosinophil % : 3.1 %  Auto Basophil % : 0.3 %    06-02    138  |  98  |  13.0  ----------------------------<  85  4.9   |  29.0  |  0.56    Ca    9.4      02 Jun 2019 08:23  Phos  3.3     06-02  Mg     2.0     06-02          ALLERGIES  Arthrotec (Unknown)  aspirin (Unknown)  Benadryl (Unknown)  Ceclor (Unknown)  codeine (Unknown)  cortisone (Unknown)  doxycycline (Unknown)  erythromycin (Unknown)  Levaquin (Unknown)  penicillins (Unknown)  sulfa drugs (Unknown)  tetracycline (Unknown)  Toprol-XL (Unknown)  Valium (Unknown)  Vibramycin (Unknown)  Zinc (Unknown)      MEDICATIONS   acetaminophen   Tablet .. 650 milliGRAM(s) Oral every 6 hours  docusate sodium 100 milliGRAM(s) Oral three times a day  gabapentin 100 milliGRAM(s) Oral daily  gabapentin 200 milliGRAM(s) Oral at bedtime  lidocaine   Patch 2 Patch Transdermal every 24 hours  polyethylene glycol 3350 17 Gram(s) Oral daily  propranolol LA 60 milliGRAM(s) Oral at bedtime  senna 2 Tablet(s) Oral at bedtime  sertraline 25 milliGRAM(s) Oral daily  traMADol 25 milliGRAM(s) Oral every 4 hours PRN    ----------------------------------------------------------------------------------------  PHYSICAL EXAM  Constitutional - NAD, Comfortable  HEENT - NCAT, EOMI  Chest - Breathing comfortably, No wheezing  Cardiovascular - S1S2   Abdomen - Soft   Extremities - No C/C/E, No calf tenderness   Neurologic Exam -                    Cognitive - Awake, Alert, AAO to self, place, date, year, situation     Communication - Fluent, No dysarthria     Motor - grossly 4/5 with some limitations on b/l UE due to prior rotator cuff injuries b/l       Sensory - Intact to LT  MSK: Tenderness to palpation of thoracic spine, tlso in place  Psychiatric - Mood stable, Affect WNL      ------------------------------------------------------------------------------------------------  ASSESSMENT/PLAN  88yFemale with functional deficits after fall with resulting acute nondisplaced T7 fracture and acute/subacute L1 inferior endplate fracture  Fracture: as per surgical eval, conservative management, maintain TLSO, continue Incentive spirometry to prevent from atelectasis  Pain - Tylenol, tramadol PRN, Consider changing lidocaine patch to the AM as patientspain is greatest at this time. patient declines addition of oxycodone as she has "bad reaction" to it  DVT PPX - lovenox,  SCDs  Rehab -    Recommend ANTONETTE, patient DOES NOT meet acute inpatient rehabilitation criteria. Patient likely unable to tolerate full 3 hours/day of PT/OT however should continue therapy as tolerated. Her pain is limiting factor but patient and daughter adjustment in medication at this time. Patient and her daughter agree for plan for ANTONETTE. 88y Female was admitted on 06-01  In ED, GCS= 15    Patient is a 88y old  Female who presents with a chief complaint of fall  HPI: 88 year old female with a history of chronic back pain and baseline urinary incontinence presents to ED on 5/21 s/p fall from seated walker, -LOC, +headstrike. Per patient, daughter was rolling pt in a seated rolling walker when they hit a curb causing the pt to fall backward onto the walker's back bar causing immediate pain. Pain radiated down entire spine, 10/10 pain which lasted several minutes. The pain has subsided slightly but pt reports both spinal and paraspinal pain at this time, now 6/10 in severity. She also reported head soreness and neck soreness. Denies headache, changes in vision, chest pain, shortness of breath, nausea, vomiting. CT spine demonstrated acute nondisplaced T7 fracture. MRI of thoracic spine redisplaying T7 vertebral body fx with acute/subacute L1 fracture as well without posterior element involvement or ligamentous injury. Patient was evaluated by neurosurgery and trauma surgery teams and recommended conservative management with TLSO brace and pain control.     Imaging showed:  HEAD CT - No acute findings  C SPINE CT - No acute findings, Mild/moderate central C3-4 disc extrusion.  CT spine demonstrates acute nondisplaced T7 fracture.  MRI thoracic Spine: Acute nondisplaced fracture through the T7 vertebral body. Acute to subacute fracture inferior endplate L1. Spinal cord negative      TODAY'S SUBJECTIVE HX:  Patient still endorses pain in the mid-back that is worsened with activity and deep inhalation. She states that most of her pain is in the AM during activity.     REVIEW OF SYSTEMS  Constitutional - No fever, admits general fatigue  HEENT - admits difficulty hearing especially on the left  Respiratory - No cough, No wheezing, No shortness of breath  Cardiovascular - No chest pain, No palpitations  Gastrointestinal - No abdominal pain, No nausea, No vomiting, No diarrhea, No constipation  Genitourinary - No dysuria, admits chronic incontinenece  Neurological - No headaches,  No loss of strength, No numbness  Musculoskeletal - admits back pain      VITALS  T(C): 36.6 (06-03-19 @ 07:15), Max: 37.3 (06-02-19 @ 20:37)  HR: 77 (06-03-19 @ 07:15) (74 - 87)  BP: 141/71 (06-03-19 @ 07:15) (138/76 - 158/78)  RR: 18 (06-03-19 @ 07:15) (17 - 18)  SpO2: 91% (06-03-19 @ 07:15) (91% - 96%)  Wt(kg): --    PAST MEDICAL & SURGICAL HISTORY  Chronic back pain  Migraines  Spinal stenosis  Cataract, right eye  Hip fracture requiring operative repair, right, sequela  History of cholecystectomy  H/O: hysterectomy  No significant past surgical history      SOCIAL HISTORY  Smoking - Denied  EtOH - Denied   Drugs - Denied    FUNCTIONAL HISTORY  Lives alone. Patient is homemaker. No CINDY or inside house. PTA patient Ambulates with RW indoors and rollator for community. Daughter assists with groceries /cooking and appointments. patient states that she required assistance with transfers from bed and PTA had her husbands aid help with this. However, her  recently passed away and she lost her social security benefits so does not have her own aid.    CURRENT FUNCTIONAL STATUS 6/2  Bed mobility: mod A  Transfers: mod A  Gait: min a with RW 10 feet with TLSO  ADLs: tba    FAMILY HISTORY       RECENT LABS/IMAGING  CBC Full  -  ( 02 Jun 2019 08:23 )  WBC Count : 7.4 K/uL  RBC Count : 4.98 M/uL  Hemoglobin : 10.7 g/dL  Hematocrit : 34.2 %  Platelet Count - Automated : 229 K/uL  Mean Cell Volume : 68.7 fl  Mean Cell Hemoglobin : 21.5 pg  Mean Cell Hemoglobin Concentration : 31.3 g/dL  Auto Neutrophil # : 3.9 K/uL  Auto Lymphocyte # : 2.2 K/uL  Auto Monocyte # : 1.0 K/uL  Auto Eosinophil # : 0.2 K/uL  Auto Basophil # : 0.0 K/uL  Auto Neutrophil % : 53.3 %  Auto Lymphocyte % : 29.5 %  Auto Monocyte % : 13.7 %  Auto Eosinophil % : 3.1 %  Auto Basophil % : 0.3 %    06-02    138  |  98  |  13.0  ----------------------------<  85  4.9   |  29.0  |  0.56    Ca    9.4      02 Jun 2019 08:23  Phos  3.3     06-02  Mg     2.0     06-02          ALLERGIES  Arthrotec (Unknown)  aspirin (Unknown)  Benadryl (Unknown)  Ceclor (Unknown)  codeine (Unknown)  cortisone (Unknown)  doxycycline (Unknown)  erythromycin (Unknown)  Levaquin (Unknown)  penicillins (Unknown)  sulfa drugs (Unknown)  tetracycline (Unknown)  Toprol-XL (Unknown)  Valium (Unknown)  Vibramycin (Unknown)  Zinc (Unknown)      MEDICATIONS   acetaminophen   Tablet .. 650 milliGRAM(s) Oral every 6 hours  docusate sodium 100 milliGRAM(s) Oral three times a day  gabapentin 100 milliGRAM(s) Oral daily  gabapentin 200 milliGRAM(s) Oral at bedtime  lidocaine   Patch 2 Patch Transdermal every 24 hours  polyethylene glycol 3350 17 Gram(s) Oral daily  propranolol LA 60 milliGRAM(s) Oral at bedtime  senna 2 Tablet(s) Oral at bedtime  sertraline 25 milliGRAM(s) Oral daily  traMADol 25 milliGRAM(s) Oral every 4 hours PRN    ----------------------------------------------------------------------------------------  PHYSICAL EXAM  Constitutional - NAD, Comfortable  HEENT - NCAT, EOMI  Chest - Breathing comfortably, No wheezing  Cardiovascular - S1S2   Abdomen - Soft   Extremities - No C/C/E, No calf tenderness   Neurologic Exam -                    Cognitive - Awake, Alert, AAO to self, place, date, year, situation     Communication - Fluent, No dysarthria     Motor - grossly 4/5 with some limitations on b/l UE due to prior rotator cuff injuries b/l       Sensory - Intact to LT  MSK: Tenderness to palpation of thoracic spine, tlso in place  Psychiatric - Mood stable, Affect WNL      ------------------------------------------------------------------------------------------------  ASSESSMENT/PLAN  88yFemale with functional deficits after fall with resulting acute nondisplaced T7 fracture and acute/subacute L1 inferior endplate fracture  Fracture: as per surgical eval, conservative management, maintain TLSO, continue Incentive spirometry to prevent from atelectasis  Pain - Tylenol, tramadol PRN, Consider changing lidocaine patch to the AM as patientspain is greatest at this time. patient declines addition of oxycodone as she has "bad reaction" to it  DVT PPX - lovenox,  SCDs  Rehab -    Recommend [facility], patient DOES NOT meet acute inpatient rehabilitation criteria. Patient likely unable to tolerate full 3 hours/day of PT/OT however should continue therapy as tolerated. Her pain is limiting factor but patient and daughter adjustment in medication at this time. COnsider timing pain medications prior to therapy sessions. Patient and her daughter agree for plan for ANTONETTE.

## 2019-06-03 NOTE — PROGRESS NOTE ADULT - SUBJECTIVE AND OBJECTIVE BOX
HPI/OVERNIGHT EVENTS: Patient seen & examined at bedside. She states mid back pain is worse with movement, medications giving only mild relief. Denies numbness / tingling to upper or lower extremities b/l. Denies saddle anesthesia, bowel or bladder incontinence. Was OOB with physical therapy, able to stand next to bed but has not ambulated much. Having bowel movements & passing flatus, and voiding without difficulty. Tolerating diet. Denies fever, chills, CP, or SOB.     MEDICATIONS  (STANDING):  acetaminophen   Tablet .. 650 milliGRAM(s) Oral every 6 hours  docusate sodium 100 milliGRAM(s) Oral three times a day  enoxaparin Injectable 40 milliGRAM(s) SubCutaneous daily  gabapentin 100 milliGRAM(s) Oral daily  gabapentin 200 milliGRAM(s) Oral at bedtime  lidocaine   Patch 2 Patch Transdermal every 24 hours  polyethylene glycol 3350 17 Gram(s) Oral daily  propranolol LA 60 milliGRAM(s) Oral at bedtime  senna 2 Tablet(s) Oral at bedtime  sertraline 25 milliGRAM(s) Oral daily    MEDICATIONS  (PRN):  traMADol 25 milliGRAM(s) Oral every 4 hours PRN Severe Pain (7 - 10)      Vital Signs Last 24 Hrs  T(C): 36.6 (03 Jun 2019 07:15), Max: 37.3 (02 Jun 2019 20:37)  T(F): 97.9 (03 Jun 2019 07:15), Max: 99.1 (02 Jun 2019 20:37)  HR: 77 (03 Jun 2019 07:15) (76 - 87)  BP: 141/71 (03 Jun 2019 07:15) (138/76 - 158/78)  BP(mean): --  RR: 18 (03 Jun 2019 07:15) (17 - 18)  SpO2: 91% (03 Jun 2019 07:15) (91% - 95%)    Constitutional: elderly female, appears comfortable lying in bed, TLSO brace @ bedside, NAD  HEENT: EOMI / PERRL b/l, no drainage or redness  Respiratory: respirations are unlabored, no accessory muscle use, no conversational dyspnea  Cardiovascular: regular rate & rhythm  Gastrointestinal: abdomen soft, non-tender, non-distended, no rebound tenderness / guarding  Neurological: GCS: 15 (4/5/6). A&O x 3; no gross sensory / motor / coordination deficits  Musculoskeletal: No joint pain, swelling or deformity. LE strength exam limited 2/2 pain. Midback tender no stepoffs or external trauma to the back.     I&O's Detail    02 Jun 2019 07:01  -  03 Jun 2019 07:00  --------------------------------------------------------  IN:  Total IN: 0 mL    OUT:    Stool: 2 mL    Voided: 1051 mL  Total OUT: 1053 mL    Total NET: -1053 mL          LABS:                        10.7   7.4   )-----------( 229      ( 02 Jun 2019 08:23 )             34.2     06-02    138  |  98  |  13.0  ----------------------------<  85  4.9   |  29.0  |  0.56    Ca    9.4      02 Jun 2019 08:23  Phos  3.3     06-02  Mg     2.0     06-02

## 2019-06-04 PROCEDURE — 71045 X-RAY EXAM CHEST 1 VIEW: CPT | Mod: 26

## 2019-06-04 PROCEDURE — 99231 SBSQ HOSP IP/OBS SF/LOW 25: CPT

## 2019-06-04 RX ORDER — DOCUSATE SODIUM 100 MG
1 CAPSULE ORAL
Qty: 0 | Refills: 0 | DISCHARGE
Start: 2019-06-04

## 2019-06-04 RX ORDER — LIDOCAINE 4 G/100G
2 CREAM TOPICAL
Qty: 0 | Refills: 0 | DISCHARGE
Start: 2019-06-04

## 2019-06-04 RX ORDER — SENNA PLUS 8.6 MG/1
2 TABLET ORAL
Qty: 0 | Refills: 0 | DISCHARGE
Start: 2019-06-04

## 2019-06-04 RX ORDER — POLYETHYLENE GLYCOL 3350 17 G/17G
17 POWDER, FOR SOLUTION ORAL
Qty: 0 | Refills: 0 | DISCHARGE
Start: 2019-06-04

## 2019-06-04 RX ORDER — ACETAMINOPHEN 500 MG
2 TABLET ORAL
Qty: 0 | Refills: 0 | DISCHARGE
Start: 2019-06-04

## 2019-06-04 RX ADMIN — SERTRALINE 25 MILLIGRAM(S): 25 TABLET, FILM COATED ORAL at 10:22

## 2019-06-04 RX ADMIN — Medication 650 MILLIGRAM(S): at 05:38

## 2019-06-04 RX ADMIN — Medication 650 MILLIGRAM(S): at 12:00

## 2019-06-04 RX ADMIN — Medication 60 MILLIGRAM(S): at 22:05

## 2019-06-04 RX ADMIN — Medication 650 MILLIGRAM(S): at 17:52

## 2019-06-04 RX ADMIN — LIDOCAINE 2 PATCH: 4 CREAM TOPICAL at 10:22

## 2019-06-04 RX ADMIN — ENOXAPARIN SODIUM 40 MILLIGRAM(S): 100 INJECTION SUBCUTANEOUS at 10:23

## 2019-06-04 RX ADMIN — LIDOCAINE 2 PATCH: 4 CREAM TOPICAL at 17:47

## 2019-06-04 RX ADMIN — Medication 650 MILLIGRAM(S): at 18:30

## 2019-06-04 RX ADMIN — Medication 650 MILLIGRAM(S): at 06:05

## 2019-06-04 RX ADMIN — Medication 650 MILLIGRAM(S): at 12:30

## 2019-06-04 RX ADMIN — GABAPENTIN 100 MILLIGRAM(S): 400 CAPSULE ORAL at 10:22

## 2019-06-04 RX ADMIN — GABAPENTIN 200 MILLIGRAM(S): 400 CAPSULE ORAL at 22:05

## 2019-06-04 RX ADMIN — LIDOCAINE 2 PATCH: 4 CREAM TOPICAL at 22:05

## 2019-06-04 NOTE — PROGRESS NOTE ADULT - ASSESSMENT
89 y/o female s/p fall with acute non-displaced fx through T7 vertebral body.  -continue PT with brace  -dispo; ANTONETTE placement.  Pt ready to be discharged

## 2019-06-04 NOTE — PROGRESS NOTE ADULT - SUBJECTIVE AND OBJECTIVE BOX
INTERVAL HPI/OVERNIGHT EVENTS:    SUBJECTIVE:  TIFFANY overnight.  Pt was c/o sob, but had a hard time explaining her sob.  CXR was taken which does not show any acute pulmonary issues and patient is saturating well.  Pt does say her respiratory discomfort comes from the brace she puts on and she gets sore from it.  Pt was given reassurance.  No f/ch/cp/n/v.  Pt having BMs and tolerating diet.      MEDICATIONS  (STANDING):  acetaminophen   Tablet .. 650 milliGRAM(s) Oral every 6 hours  docusate sodium 100 milliGRAM(s) Oral three times a day  enoxaparin Injectable 40 milliGRAM(s) SubCutaneous daily  gabapentin 100 milliGRAM(s) Oral daily  gabapentin 200 milliGRAM(s) Oral at bedtime  lidocaine   Patch 2 Patch Transdermal every 24 hours  polyethylene glycol 3350 17 Gram(s) Oral daily  propranolol LA 60 milliGRAM(s) Oral at bedtime  senna 2 Tablet(s) Oral at bedtime  sertraline 25 milliGRAM(s) Oral daily    MEDICATIONS  (PRN):  traMADol 25 milliGRAM(s) Oral every 4 hours PRN Severe Pain (7 - 10)      Vital Signs Last 24 Hrs  T(C): 36.6 (04 Jun 2019 07:30), Max: 37.3 (03 Jun 2019 20:07)  T(F): 97.8 (04 Jun 2019 07:30), Max: 99.2 (03 Jun 2019 20:07)  HR: 76 (04 Jun 2019 07:30) (76 - 90)  BP: 146/71 (04 Jun 2019 07:30) (123/61 - 160/84)  BP(mean): --  RR: 17 (04 Jun 2019 07:30) (17 - 18)  SpO2: 95% (04 Jun 2019 07:30) (93% - 95%)    PE  Gen: NAD  Pulm: nonlabored breathing  CV: RRR  Abd: soft, nt, nd  Ext: no cyanosis, clubbing, or edema  Neuro: AAOx3, no sensory or motor deficits      I&O's Detail    03 Jun 2019 07:01  -  04 Jun 2019 07:00  --------------------------------------------------------  IN:    Oral Fluid: 480 mL  Total IN: 480 mL    OUT:    Voided: 600 mL  Total OUT: 600 mL    Total NET: -120 mL      04 Jun 2019 07:01  -  04 Jun 2019 09:53  --------------------------------------------------------  IN:  Total IN: 0 mL    OUT:    Voided: 300 mL  Total OUT: 300 mL    Total NET: -300 mL          LABS:                RADIOLOGY & ADDITIONAL STUDIES:

## 2019-06-05 ENCOUNTER — INBOUND DOCUMENT (OUTPATIENT)
Age: 84
End: 2019-06-05

## 2019-06-05 ENCOUNTER — TRANSCRIPTION ENCOUNTER (OUTPATIENT)
Age: 84
End: 2019-06-05

## 2019-06-05 VITALS
HEART RATE: 74 BPM | SYSTOLIC BLOOD PRESSURE: 155 MMHG | OXYGEN SATURATION: 95 % | DIASTOLIC BLOOD PRESSURE: 76 MMHG | TEMPERATURE: 98 F | RESPIRATION RATE: 18 BRPM

## 2019-06-05 PROCEDURE — 71046 X-RAY EXAM CHEST 2 VIEWS: CPT

## 2019-06-05 PROCEDURE — 80048 BASIC METABOLIC PNL TOTAL CA: CPT

## 2019-06-05 PROCEDURE — 72170 X-RAY EXAM OF PELVIS: CPT

## 2019-06-05 PROCEDURE — 97167 OT EVAL HIGH COMPLEX 60 MIN: CPT

## 2019-06-05 PROCEDURE — 72125 CT NECK SPINE W/O DYE: CPT

## 2019-06-05 PROCEDURE — 71045 X-RAY EXAM CHEST 1 VIEW: CPT

## 2019-06-05 PROCEDURE — 36415 COLL VENOUS BLD VENIPUNCTURE: CPT

## 2019-06-05 PROCEDURE — 85027 COMPLETE CBC AUTOMATED: CPT

## 2019-06-05 PROCEDURE — G0378: CPT

## 2019-06-05 PROCEDURE — 97116 GAIT TRAINING THERAPY: CPT

## 2019-06-05 PROCEDURE — 99238 HOSP IP/OBS DSCHRG MGMT 30/<: CPT

## 2019-06-05 PROCEDURE — 99285 EMERGENCY DEPT VISIT HI MDM: CPT | Mod: 25

## 2019-06-05 PROCEDURE — 70450 CT HEAD/BRAIN W/O DYE: CPT

## 2019-06-05 PROCEDURE — 72128 CT CHEST SPINE W/O DYE: CPT

## 2019-06-05 PROCEDURE — 97110 THERAPEUTIC EXERCISES: CPT

## 2019-06-05 PROCEDURE — 83735 ASSAY OF MAGNESIUM: CPT

## 2019-06-05 PROCEDURE — 97163 PT EVAL HIGH COMPLEX 45 MIN: CPT

## 2019-06-05 PROCEDURE — 72146 MRI CHEST SPINE W/O DYE: CPT

## 2019-06-05 PROCEDURE — 84100 ASSAY OF PHOSPHORUS: CPT

## 2019-06-05 RX ADMIN — Medication 650 MILLIGRAM(S): at 05:32

## 2019-06-05 RX ADMIN — SERTRALINE 25 MILLIGRAM(S): 25 TABLET, FILM COATED ORAL at 11:38

## 2019-06-05 RX ADMIN — Medication 650 MILLIGRAM(S): at 00:28

## 2019-06-05 RX ADMIN — GABAPENTIN 100 MILLIGRAM(S): 400 CAPSULE ORAL at 11:36

## 2019-06-05 RX ADMIN — Medication 650 MILLIGRAM(S): at 00:16

## 2019-06-05 RX ADMIN — Medication 650 MILLIGRAM(S): at 05:46

## 2019-06-05 RX ADMIN — Medication 650 MILLIGRAM(S): at 11:36

## 2019-06-05 NOTE — CHART NOTE - NSCHARTNOTEFT_GEN_A_CORE
Upon Nutritional Assessment by the Registered Dietitian your patient was determined to meet criteria / has evidence of the following diagnosis/diagnoses:          [ ]  Mild Protein Calorie Malnutrition        [x]  Moderate Protein Calorie Malnutrition        [ ] Severe Protein Calorie Malnutrition        [ ] Unspecified Protein Calorie Malnutrition        [ ] Underweight / BMI <19        [ ] Morbid Obesity / BMI > 40    Pt presets with malnutrition (moderate, chronic) related to inability to meet sufficient protein-energy in setting of advanced age and overall decreased appetite as evidenced by meeting <75% nutrient needs >1 month, mild muscle loss of temples and clavicles and mild fat loss of orbitals.    Findings as based on:  •  Comprehensive nutrition assessment and consultation  •  Calorie counts (nutrient intake analysis)  •  Food acceptance and intake status from observations by staff  •  Follow up  •  Patient education  •  Intervention secondary to interdisciplinary rounds  •   concerns      Treatment:    The following has been recommended:    1) Continue diet as tolerated.  2) Add Ensure Enlive TID to optimize po intake and provide an additional 350 kcal, 20g protein per serving.  3) Rx: MVI and vitamin C 500mg daily.       PROVIDER Section:     By signing this assessment you are acknowledging and agree with the diagnosis/diagnoses assigned by the Registered Dietitian    Comments:

## 2019-06-05 NOTE — PROGRESS NOTE ADULT - ASSESSMENT
87 y/o female s/p fall with acute non-displaced fx through T7 vertebral body.  -continue PT with brace  -dispo; ANTONETTE placement.  Pt ready to be discharged

## 2019-06-05 NOTE — DIETITIAN INITIAL EVALUATION ADULT. - ETIOLOGY
related to inability to meet sufficient protein-energy in setting of advanced age and overall decreased appetite

## 2019-06-05 NOTE — PROGRESS NOTE ADULT - SUBJECTIVE AND OBJECTIVE BOX
INTERVAL HPI/OVERNIGHT EVENTS:    No acute overnight events reported. Patient seen this AM with no major complaints nor issues. Patient denies chest pain, nausea, vomiting, fevers, chills, diarrhea. dizziness nor headaches       MEDICATIONS  (STANDING):  acetaminophen   Tablet .. 650 milliGRAM(s) Oral every 6 hours  docusate sodium 100 milliGRAM(s) Oral three times a day  enoxaparin Injectable 40 milliGRAM(s) SubCutaneous daily  gabapentin 100 milliGRAM(s) Oral daily  gabapentin 200 milliGRAM(s) Oral at bedtime  lidocaine   Patch 2 Patch Transdermal every 24 hours  polyethylene glycol 3350 17 Gram(s) Oral daily  propranolol LA 60 milliGRAM(s) Oral at bedtime  senna 2 Tablet(s) Oral at bedtime  sertraline 25 milliGRAM(s) Oral daily    MEDICATIONS  (PRN):  traMADol 25 milliGRAM(s) Oral every 4 hours PRN Severe Pain (7 - 10)      Vital Signs Last 24 Hrs  T(C): 36.7 (05 Jun 2019 00:33), Max: 36.7 (04 Jun 2019 20:50)  T(F): 98 (05 Jun 2019 00:33), Max: 98 (04 Jun 2019 20:50)  HR: 87 (05 Jun 2019 00:33) (76 - 88)  BP: 142/69 (05 Jun 2019 00:33) (138/72 - 155/69)  BP(mean): --  RR: 18 (05 Jun 2019 00:33) (17 - 18)  SpO2: 93% (05 Jun 2019 00:33) (93% - 95%)    PE  Gen: NAD  Pulm: nonlabored breathing  CV: RRR  Abd: soft, nt, nd  Ext: no cyanosis, clubbing, or edema  Neuro: AAOx3, no sensory or motor deficits      I&O's Detail    03 Jun 2019 07:01  -  04 Jun 2019 07:00  --------------------------------------------------------  IN:    Oral Fluid: 480 mL  Total IN: 480 mL    OUT:    Voided: 600 mL  Total OUT: 600 mL    Total NET: -120 mL      04 Jun 2019 07:01  -  05 Jun 2019 02:27  --------------------------------------------------------  IN:  Total IN: 0 mL    OUT:    Voided: 500 mL  Total OUT: 500 mL    Total NET: -500 mL          LABS:                RADIOLOGY & ADDITIONAL STUDIES:

## 2019-06-05 NOTE — DIETITIAN INITIAL EVALUATION ADULT. - OTHER INFO
87 y/o female s/p fall with acute non-displaced fx through T7 vertebral body. Pt reports PTA not eating very well, states she prepares meals herself which is sometimes difficult. Since admission, fair po intake. Denies chewing/swallowing difficulty. Reports  lbs. Provided assistance with filling out menu.

## 2019-06-05 NOTE — DIETITIAN INITIAL EVALUATION ADULT. - PHYSICAL APPEARANCE
BMI 24.2 NFPE performed, physical findings include mild muscle loss of temples and clavicles and mild fat loss of orbitals

## 2019-06-05 NOTE — DIETITIAN INITIAL EVALUATION ADULT. - NS FNS SUPPLEMENTS
Ensure® Enlive®/TID to optimize po intake and provide an additional 350 kcal, 20g protein per serving. Rx: MVI and vitamin C 500mg daily

## 2019-06-05 NOTE — DISCHARGE NOTE NURSING/CASE MANAGEMENT/SOCIAL WORK - NSDCDPATPORTLINK_GEN_ALL_CORE
You can access the [facility] Patient Portal, offered by [facility], by registering with the following website: [email or web address]

## 2019-07-10 ENCOUNTER — FORM ENCOUNTER (OUTPATIENT)
Age: 84
End: 2019-07-10

## 2019-07-11 ENCOUNTER — OUTPATIENT (OUTPATIENT)
Dept: OUTPATIENT SERVICES | Facility: HOSPITAL | Age: 84
LOS: 1 days | End: 2019-07-11
Payer: MEDICARE

## 2019-07-11 ENCOUNTER — APPOINTMENT (OUTPATIENT)
Dept: RADIOLOGY | Facility: CLINIC | Age: 84
End: 2019-07-11
Payer: MEDICARE

## 2019-07-11 ENCOUNTER — APPOINTMENT (OUTPATIENT)
Dept: NEUROSURGERY | Facility: CLINIC | Age: 84
End: 2019-07-11
Payer: MEDICARE

## 2019-07-11 DIAGNOSIS — Z00.8 ENCOUNTER FOR OTHER GENERAL EXAMINATION: ICD-10-CM

## 2019-07-11 DIAGNOSIS — Z90.49 ACQUIRED ABSENCE OF OTHER SPECIFIED PARTS OF DIGESTIVE TRACT: Chronic | ICD-10-CM

## 2019-07-11 DIAGNOSIS — H26.9 UNSPECIFIED CATARACT: Chronic | ICD-10-CM

## 2019-07-11 DIAGNOSIS — Z90.710 ACQUIRED ABSENCE OF BOTH CERVIX AND UTERUS: Chronic | ICD-10-CM

## 2019-07-11 DIAGNOSIS — S72.001S FRACTURE OF UNSPECIFIED PART OF NECK OF RIGHT FEMUR, SEQUELA: Chronic | ICD-10-CM

## 2019-07-11 PROCEDURE — 72100 X-RAY EXAM L-S SPINE 2/3 VWS: CPT | Mod: 26

## 2019-07-11 PROCEDURE — 72070 X-RAY EXAM THORAC SPINE 2VWS: CPT

## 2019-07-11 PROCEDURE — 72100 X-RAY EXAM L-S SPINE 2/3 VWS: CPT

## 2019-07-11 PROCEDURE — 99215 OFFICE O/P EST HI 40 MIN: CPT

## 2019-07-11 PROCEDURE — 72070 X-RAY EXAM THORAC SPINE 2VWS: CPT | Mod: 26

## 2019-08-21 ENCOUNTER — FORM ENCOUNTER (OUTPATIENT)
Age: 84
End: 2019-08-21

## 2019-08-22 ENCOUNTER — APPOINTMENT (OUTPATIENT)
Dept: NEUROSURGERY | Facility: CLINIC | Age: 84
End: 2019-08-22
Payer: MEDICARE

## 2019-08-22 ENCOUNTER — APPOINTMENT (OUTPATIENT)
Dept: RADIOLOGY | Facility: CLINIC | Age: 84
End: 2019-08-22
Payer: MEDICARE

## 2019-08-22 ENCOUNTER — OUTPATIENT (OUTPATIENT)
Dept: OUTPATIENT SERVICES | Facility: HOSPITAL | Age: 84
LOS: 1 days | End: 2019-08-22
Payer: MEDICARE

## 2019-08-22 VITALS
OXYGEN SATURATION: 94 % | HEART RATE: 73 BPM | WEIGHT: 105 LBS | SYSTOLIC BLOOD PRESSURE: 135 MMHG | BODY MASS INDEX: 23.62 KG/M2 | TEMPERATURE: 98.2 F | HEIGHT: 56 IN | DIASTOLIC BLOOD PRESSURE: 83 MMHG

## 2019-08-22 DIAGNOSIS — Z90.710 ACQUIRED ABSENCE OF BOTH CERVIX AND UTERUS: Chronic | ICD-10-CM

## 2019-08-22 DIAGNOSIS — H26.9 UNSPECIFIED CATARACT: Chronic | ICD-10-CM

## 2019-08-22 DIAGNOSIS — S22.000A WEDGE COMPRESSION FRACTURE OF UNSPECIFIED THORACIC VERTEBRA, INITIAL ENCOUNTER FOR CLOSED FRACTURE: ICD-10-CM

## 2019-08-22 DIAGNOSIS — Z90.49 ACQUIRED ABSENCE OF OTHER SPECIFIED PARTS OF DIGESTIVE TRACT: Chronic | ICD-10-CM

## 2019-08-22 DIAGNOSIS — S72.001S FRACTURE OF UNSPECIFIED PART OF NECK OF RIGHT FEMUR, SEQUELA: Chronic | ICD-10-CM

## 2019-08-22 DIAGNOSIS — S32.000A WEDGE COMPRESSION FRACTURE OF UNSPECIFIED LUMBAR VERTEBRA, INITIAL ENCOUNTER FOR CLOSED FRACTURE: ICD-10-CM

## 2019-08-22 DIAGNOSIS — Z00.8 ENCOUNTER FOR OTHER GENERAL EXAMINATION: ICD-10-CM

## 2019-08-22 PROCEDURE — 72070 X-RAY EXAM THORAC SPINE 2VWS: CPT

## 2019-08-22 PROCEDURE — 72100 X-RAY EXAM L-S SPINE 2/3 VWS: CPT

## 2019-08-22 PROCEDURE — 72100 X-RAY EXAM L-S SPINE 2/3 VWS: CPT | Mod: 26

## 2019-08-22 PROCEDURE — 99214 OFFICE O/P EST MOD 30 MIN: CPT

## 2019-08-22 PROCEDURE — 72070 X-RAY EXAM THORAC SPINE 2VWS: CPT | Mod: 26

## 2019-08-22 NOTE — CONSULT LETTER
[Dear  ___] : Dear  [unfilled], [FreeTextEntry1] : Mrs. Rojas is a very pleasant 88-year-old female patient who was seen in our office today in regards to his spinal fracture suffered after a fall. The patient is accompanied with her daughter today at this visit.\par \par Briefly, the patient suffered a mechanical fall on May 31, 2019. The patient was subsequently taken to [facility] where subsequent CT and MRI scans were performed which demonstrated an endplate fracture at L1, and a vertebral compression fracture at T7 on the background extensive ankylosis. The patient was fitted for a TLSO brace and sent to rehabilitation. Currently, the patient continues to experience pain in the thoracic and lumbar region which has improved since her hospital admission. Patient's pain is worse with sneezing and coughing and the patient complains of shortness of breath since her hospital admission. Currently, the patient only takes gabapentin and Tylenol which she was on previously for her chronic back pain. At this visit, the patient appears to be wearing an LSO brace without any thoracic support. Her low back and mid back pain is rated at a 7/10 in severity. The patient has been participating with exercise and physical therapy but recumbency provides her the most relief.\par \par The patient is accompanied with an x-ray of the thoracic spine and lumbar spine performed today. The patient's L1 fracture remains stable and similar in appearance compared to her hospital stay. The T7 fracture however appears to demonstrate approximately 5° of worsening kyphosis although the mid body intervertebral height remains stable.\par \par On examination, patient is alert, oriented, and compliant with the exam. The patient demonstrates 5/5 strength appropriate for her age in the upper and lower extremities bilaterally. The patient has point tenderness in the mid thoracic and upper lumbar spine consistent with her fracture location.\par \par Taken together, the patient has a clinical history and radiographic findings most consistent with mid back and low back pain secondary to vertebral fractures at T7 and L1. The L1 vertebral fracture appears stable and does not demonstrate any concerning radiologic appearance. The T7 fracture, however, displays a mild worsening of her kyphotic deformity. When asked, the patient states that she was instructed to flex her spine during the x-ray, and thus this may represent normal motion. Regardless, I have recommended that the patient be fitted for an appropriate TLSO brace which would provide some protection from progressive thoracic kyphosis. 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 I have also recommended a bone density scan and a rheumatology followup for the very likely possibility of osteoporosis or osteopenia. I have recommended followup in approximately 6 weeks' time to review her progress. [FreeTextEntry2] : Bruce Hanks MD\par [address], \par [place] [id number] [FreeTextEntry3] : William Akins MD, PhD, FRCSC, FAANS\par \par Attending Neurosurgeon\par [facility]\par  of Neurosurgery\par [facility] \[facility]""/[facility]\par \par [address] \[place] [address]\par \par Office: [phone]\par Fax: [phone]\par Email: [email or web address] \par  [Consult Letter:] : I had the pleasure of evaluating your patient, [unfilled]. [Consult Closing:] : Thank you very much for allowing me to participate in the care of this patient.  If you have any questions, please do not hesitate to contact me.

## 2019-08-22 NOTE — CONSULT LETTER
[FreeTextEntry3] : \par William Akins MD, PhD, FRCSC, FAANS\par \par Attending Neurosurgeon\par [facility] [place]\par  of Neurosurgery\par [facility]\par \par [address]\[place] [address]\par \par Office: [phone]\par Fax: [phone]\par Email: [email or web address]\par   [FreeTextEntry1] : Mrs. Rojas pleasant 80-year-old female patient who was seen in our office today in followup regarding T7 and L1 compression fractures.\par \par I am happy to report that the patient is currently doing well following her injury. The patient states she has minimal pain in the thoracic and lumbar spine, but rather only has pain in the right buttock when sitting extended periods of time. The patient continues to complain of shortness of breath with activity.\par \par On examination, patient is alert, oriented, and compliant with the exam. The patient demonstrates 5/5 strength appropriate for her age in the upper and lower extremities bilaterally. The patient continues to use a walker for ambulation.\par \par The patient is accompanied with an x-ray of the lumbar and thoracic spine performed today which demonstrates stability of her fractures.\par \par Given that the patient no longer has pain, and her x-rays have demonstrated stability of her fractures, I recommended that the patient may have her brace off at this time. I have encouraged the patient to continue with physical therapy particularly with postural exercises as well as extension of the core musculature. I have reiterated the need for a rheumatology followup given her osteoporosis, but the patient has stated that she will not take any medication with regards to her osteoporosis even if prescribed and thus will defer this appointment. I also recommended followup with her primary care physician or a pulmonologist with regards to her shortness of breath. At this time, the patient would like to follow up with us on an as-needed basis.

## 2020-12-09 NOTE — ED ADULT NURSE NOTE - NS ED NURSE DC INFO COMPLEXITY
No Vaccines Administered. Simple: Patient demonstrates quick and easy understanding/Verbalized Understanding

## 2021-07-08 NOTE — PROGRESS NOTE ADULT - SUBJECTIVE AND OBJECTIVE BOX
INTERVAL HPI/OVERNIGHT EVENTS:  88y Female PMH chronic back pain followed by neurologist s/p fall from seated walker found with acute T7 fracture, acute to subacute L1 inferior endplate fx. Patient seen earlier this AM, c/o mid back pain that radiates to her left rib cage region, worse with movement/specific positioning    Vital Signs Last 24 Hrs  T(C): 37.2 (01 Jun 2019 08:34), Max: 37.2 (31 May 2019 16:31)  T(F): 99 (01 Jun 2019 08:34), Max: 99 (31 May 2019 16:31)  HR: 76 (01 Jun 2019 08:34) (67 - 95)  BP: 135/61 (01 Jun 2019 08:34) (135/61 - 178/81)  BP(mean): --  RR: 18 (01 Jun 2019 08:34) (18 - 18)  SpO2: 92% (01 Jun 2019 08:34) (92% - 95%)    PHYSICAL EXAM:  GENERAL: NAD, well-groomed, well-developed  MENTAL STATUS: Awake, alert; Appropriately conversant without aphasia; following commands  CRANIAL NERVES: PERRL. Tracks examiner appropriately. Face symmetric. Diminished auditory acuity at baseline. Speech clear  MOTOR: strength 5/5 b/l upper extremities, lower extremities limited 2/2 pain, at least 4-4+/5, symmetric  SENSATION: grossly intact to light touch all extremities  CHEST/LUNG: Nonlabored breathing, no respiratory distress  EXTREMITIES: No edema b/l    LABS:                        10.9   7.3   )-----------( 252      ( 01 Jun 2019 08:07 )             33.8     06-01    139  |  99  |  14.0  ----------------------------<  90  4.1   |  28.0  |  0.57    Ca    9.6      01 Jun 2019 08:07  Phos  3.6     06-01  Mg     2.0     06-01 05-31 @ 07:01  -  06-01 @ 07:00  --------------------------------------------------------  IN: 0 mL / OUT: 2 mL / NET: -2 mL    RADIOLOGY & ADDITIONAL TESTS:  MR Thoracic Spine No Cont (06.01.19 @ 00:25)  IMPRESSION:   Acute nondisplaced fracture through the T7 vertebral body again noted as   seen on previous CT. Acute to subacute fracture inferior endplate L1.     CT Thoracic Spine No Cont (05.31.19 @ 20:07)  IMPRESSION:  Extensive ankylosis with acute nondisplaced fracture of T7    CT Cervical Spine No Cont (05.31.19 @ 20:07)  IMPRESSION:   1.  Degenerative changes of the cervical spine. No acute osseous injury..  2.  Mild/moderate central C3-4 disc extrusion.  3.  15 mm right thyroid hypodensity is of doubtful significance in this   age group.  4.  T4 wedging is most compatible with a chronic etiology.    CT Head No Cont (05.31.19 @ 20:07)  IMPRESSION:     No parenchymal contusion, hemorrhage or extra-axial collection.  No CT evidence of an acute territorial infarction.  Advanced chronic small vessel ischemic changes. Anesthesia Type: 1% lidocaine with epinephrine and 0.5% Marcaine

## 2022-06-25 NOTE — ED PROVIDER NOTE - NS ED MD DISPO DISCHARGE CCDA
Normal vision: sees adequately in most situations; can see medication labels, newsprint Patient/Caregiver provided printed discharge information.

## 2022-12-29 NOTE — DISCHARGE NOTE PROVIDER - NSDCCPCAREPLAN_GEN_ALL_CORE_FT
Patient mother called needs the order for MRI sent to patient My Chart  She is getting it done in [place] which needs a prior authorization   Please call Roxie at [phone] PRINCIPAL DISCHARGE DIAGNOSIS  Diagnosis: Closed fracture of seventh thoracic vertebra, unspecified fracture morphology, initial encounter  Assessment and Plan of Treatment: Follow up: Please call and make an appointment with either Dr. Snider or Dr. Akins 2 weeks after discharge (neuro/spine surgeons). Also, please call and make an appointment with your primary care physician as per your usual schedule.   Activity: TLSO brace when out of bed.  Diet: May continue regular diet.  Medications: Please take all home medications as prescribed by your primary care doctor. Continue to take tylenol and gabapentin for pain control, as needed, in addition to lidoderm patches.  Patient is advised to RETURN TO THE EMERGENCY DEPARTMENT for any of the following - worsening pain, fever/chills, nausea/vomiting, altered mental status, chest pain, shortness of breath, or any other new / worsening symptom.

## 2023-01-13 NOTE — OCCUPATIONAL THERAPY INITIAL EVALUATION ADULT - PHYSICAL ASSIST/NONPHYSICAL ASSIST: BED TO CHAIR, REHAB EVAL
MIS AMBULATORY ENCOUNTER  ORTHOPEDIC OFFICE VISIT    CHIEF COMPLAINT:  Office Visit (Right knee MRI results )      SUBJECTIVE:  Gonzalo Ovalle is a 17 year old female who presented requesting a follow up visit to review MRI results of her right knee.  Patient reports she is still in pain.      Patient attended this appointment with her mother today.       PROBLEM LIST:  Patient Active Problem List   Diagnosis   • Otitis media   • Dermographic urticaria   • Chronic idiopathic urticaria   • History of pineal cyst     HISTORIES:  I have reviewed the past medical history, family history, social history, medications and allergies listed in the medical record as obtained by my nursing staff and support staff and agree with their documentation.  Social History     Tobacco Use   • Smoking status: Never     Passive exposure: Yes   • Smokeless tobacco: Never   • Tobacco comments:     parent smokes inside the home   Substance Use Topics   • Alcohol use: No     Alcohol/week: 0.0 standard drinks       OBJECTIVE:  PHYSICAL EXAMINATION:  Vitals:   Visit Vitals  LMP 01/01/2023     Constitutional:  Well-developed, well-nourished female in no acute distress.  Skin: Warm, dry, intact without rash or lesion.  Neurologic:  Alert and oriented x3.  Lungs:  Respirations unlabored  Musculoskeletal:  Right knee  Can flex beyond 90 degrees    IMAGING STUDIES:  MRI results of the right knee were reviewed.  These demonstrate nothing abnormal.        ASSESSMENT:  Right knee sprain      PLAN:  Physical therapy ordered for right knee.   Call on Wednesday to check MRI Report if questions.      Follow up as needed.        No orders of the defined types were placed in this encounter.    Instructions provided as documented in the after visit summary.    The patient and her mother indicated understanding of the diagnosis and agreed with the plan of care.    On 1/16/2023, Shobha GHOSH scribed the services personally performed by Marc Santiago MD.       The documentation recorded by the scribe accurately and completely reflects the service(s) I personally performed and the decisions made by me.        1 person assist/verbal cues

## 2023-03-06 NOTE — ED ADULT TRIAGE NOTE - HEIGHT IN CM
162.56 SSKI Counseling:  I discussed with the patient the risks of SSKI including but not limited to thyroid abnormalities, metallic taste, GI upset, fever, headache, acne, arthralgias, paraesthesias, lymphadenopathy, easy bleeding, arrhythmias, and allergic reaction.

## 2023-03-21 NOTE — ED PROVIDER NOTE - NS ED ROS FT
Vitreous opacities are not visually significant. ROS: no CP/SOB. no cough. no fever. no n/v/d/c. no abd pain. no rash. no bleeding. no urinary complaints. no weakness. no vision changes.     +HA. no neck/back pain. no extremity swelling/deformity. No change in mental status. ROS: no CP/SOB. + abnormal burning sensation in throat. no cough. no fever. no vomiting, diarrhea or constipation. + nausea. no abd pain. no rash. no bleeding. no urinary complaints. no weakness. no vision changes. +HA. no neck/back pain. no extremity swelling/deformity. No change in mental status. Home

## 2023-12-14 NOTE — ED ADULT NURSE NOTE - NS ED NOTE ABUSE SUSPICION NEGLECT YN
Problem: Safety - Adult  Goal: Free from fall injury  Outcome: Progressing     Problem: Discharge Planning  Goal: Discharge to home or other facility with appropriate resources  Outcome: Progressing     Problem: Skin/Tissue Integrity  Goal: Absence of new skin breakdown  Description: 1. Monitor for areas of redness and/or skin breakdown  2. Assess vascular access sites hourly  3. Every 4-6 hours minimum:  Change oxygen saturation probe site  4. Every 4-6 hours:  If on nasal continuous positive airway pressure, respiratory therapy assess nares and determine need for appliance change or resting period.   Outcome: Progressing     Problem: Pain  Goal: Verbalizes/displays adequate comfort level or baseline comfort level  Outcome: Progressing     Problem: Respiratory - Adult  Goal: Achieves optimal ventilation and oxygenation  Outcome: Progressing     Problem: ABCDS Injury Assessment  Goal: Absence of physical injury  Outcome: Progressing     Problem: Nutrition Deficit:  Goal: Optimize nutritional status  Outcome: Progressing  Flowsheets (Taken 12/13/2023 7379 by Christiana Galindo, MS, RD, LD)  Nutrient intake appropriate for improving, restoring, or maintaining nutritional needs:   Assess nutritional status and recommend course of action   Monitor oral intake, labs, and treatment plans   Recommend appropriate diets, oral nutritional supplements, and vitamin/mineral supplements     Problem: Chronic Conditions and Co-morbidities  Goal: Patient's chronic conditions and co-morbidity symptoms are monitored and maintained or improved  Outcome: Progressing No
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
## 2024-02-23 NOTE — ED PROVIDER NOTE - CROS ED CARDIOVAS ALL NEG
negative... Yung Haq  Surgery  [address] [place] [id number]  Phone: [phone]  Fax: [phone]  Follow Up Time:     Pravin Montana)  Plastic Surgery  [address] [facility], [address]  [place] [address]  Phone: [phone]  Fax: [phone]  Follow Up Time:

## 2024-03-08 NOTE — OCCUPATIONAL THERAPY INITIAL EVALUATION ADULT - OCCUPATION
Attempted to contact patient at [phone] to discuss [facility] and services/benefits offered. No answer, left voicemail.    Connie Banegas    
Retired

## 2024-08-28 NOTE — ED ADULT TRIAGE NOTE - WEIGHT IN LBS
Health Call Center    Phone Message    May a detailed message be left on voicemail: yes     Reason for Call: Other: Deanne called requesting to speak with her care team to know if Zena was able to connect with her PCP and to discuss her water pills. Please reach out to Deanne to discuss. Thank you!     Action Taken: Other: Cardiology    Travel Screening: Not Applicable    Thank you!  [facility]       Date of Service:                                                                       "Patient left  returning a call.     Called pt and discussed concerns. Pt was wondering about her \"water pills\".   Per notes on 8/27/24, pt is okay to take 1 tablet of Torsemide when she has plans to go out. Otherwise, we continue to encourage the higher dose of diuretic but limited d/t incontinence. Also, reviewed regarding Hospice. Pt was encouraged to reach out to PCP if she has further questions regarding this but pt states she is not interested at this time. Pt to reach out if she has any concerns or questions.     Rosemarie SUMMERS  [facility]    " Left detailed message with patient from Zena ( see below) and call back number if any questions or concerns.. Rika Garcia, RN on 8/27/2024 at 3:29 PM    I did in fact hear back from PCP---he agrees that a discussion about hospice is warranted now. I think his RN was supposed to reach out to her to set up a virtual visit with him to discuss further?     In regard to the water pills--I would like her on the higher dosing when she can but she is limited a lot by the incontinence. So on days she is planning to go out, she can just take the one pill.     Thanks.    Left patient a vm message Zena out of the office until Monday 8-26-24   Will call back at that time.  Rika Garcia RN on 8/22/2024 at 1:39 PM     I have personally seen and examined the patient. I have collaborated with and supervised the 104

## 2025-05-01 NOTE — PHYSICAL THERAPY INITIAL EVALUATION ADULT - CRITERIA FOR SKILLED THERAPEUTIC INTERVENTIONS
Wound Care RN Visit     Patient seen in wound clinic for RN dressing change to RLE wound. Wound stable without signs or symptoms of infection. Will continue current therapy plan. Wound care completed as ordered. Patient aware to call wound clinic with any questions or concerns.     Patient seen today with Rosa Isela CEDENO.    Pt here after US. Pt forgot mupirocin ointment. Will bring it to next appointment.   Iodoflex, mepitel, dome, cotton, coban.   
functional limitations in following categories/rehab potential/therapy frequency/predicted duration of therapy intervention/anticipated equipment needs at discharge/anticipated discharge recommendation/impairments found/risk reduction/prevention